# Patient Record
Sex: MALE | Race: WHITE | NOT HISPANIC OR LATINO | URBAN - METROPOLITAN AREA
[De-identification: names, ages, dates, MRNs, and addresses within clinical notes are randomized per-mention and may not be internally consistent; named-entity substitution may affect disease eponyms.]

---

## 2017-05-09 ENCOUNTER — OUTPATIENT (OUTPATIENT)
Dept: OUTPATIENT SERVICES | Facility: HOSPITAL | Age: 72
LOS: 1 days | Discharge: HOME | End: 2017-05-09
Payer: MEDICARE

## 2017-05-09 ENCOUNTER — RESULT REVIEW (OUTPATIENT)
Age: 72
End: 2017-05-09

## 2017-05-09 DIAGNOSIS — Z95.5 PRESENCE OF CORONARY ANGIOPLASTY IMPLANT AND GRAFT: Chronic | ICD-10-CM

## 2017-05-09 PROCEDURE — 88305 TISSUE EXAM BY PATHOLOGIST: CPT | Mod: 26

## 2017-12-10 ENCOUNTER — TRANSCRIPTION ENCOUNTER (OUTPATIENT)
Age: 72
End: 2017-12-10

## 2018-09-06 ENCOUNTER — APPOINTMENT (OUTPATIENT)
Dept: PULMONOLOGY | Facility: CLINIC | Age: 73
End: 2018-09-06
Payer: OTHER MISCELLANEOUS

## 2018-09-06 VITALS
WEIGHT: 183 LBS | DIASTOLIC BLOOD PRESSURE: 80 MMHG | SYSTOLIC BLOOD PRESSURE: 118 MMHG | BODY MASS INDEX: 27.11 KG/M2 | OXYGEN SATURATION: 94 % | HEIGHT: 69 IN | HEART RATE: 73 BPM

## 2018-09-06 DIAGNOSIS — R59.0 LOCALIZED ENLARGED LYMPH NODES: ICD-10-CM

## 2018-09-06 PROCEDURE — 94010 BREATHING CAPACITY TEST: CPT

## 2018-09-06 PROCEDURE — 99215 OFFICE O/P EST HI 40 MIN: CPT | Mod: 25

## 2018-10-22 ENCOUNTER — APPOINTMENT (OUTPATIENT)
Dept: PULMONOLOGY | Facility: CLINIC | Age: 73
End: 2018-10-22

## 2019-11-04 ENCOUNTER — APPOINTMENT (OUTPATIENT)
Dept: PULMONOLOGY | Facility: CLINIC | Age: 74
End: 2019-11-04
Payer: COMMERCIAL

## 2019-11-04 VITALS
SYSTOLIC BLOOD PRESSURE: 120 MMHG | WEIGHT: 178 LBS | HEART RATE: 63 BPM | OXYGEN SATURATION: 96 % | BODY MASS INDEX: 26.29 KG/M2 | DIASTOLIC BLOOD PRESSURE: 60 MMHG

## 2019-11-04 DIAGNOSIS — K21.0 GASTRO-ESOPHAGEAL REFLUX DISEASE WITH ESOPHAGITIS: ICD-10-CM

## 2019-11-04 PROCEDURE — 99214 OFFICE O/P EST MOD 30 MIN: CPT

## 2019-11-04 NOTE — PHYSICAL EXAM
[General Appearance - Well Developed] : well developed [General Appearance - Well Nourished] : well nourished [Normal Conjunctiva] : the conjunctiva exhibited no abnormalities [Neck Appearance] : the appearance of the neck was normal [Heart Rate And Rhythm] : heart rate and rhythm were normal [Heart Sounds] : normal S1 and S2 [Murmurs] : no murmurs present [Arterial Pulses Normal] : the arterial pulses were normal [Edema] : no peripheral edema present [] : no respiratory distress [Respiration, Rhythm And Depth] : normal respiratory rhythm and effort [Exaggerated Use Of Accessory Muscles For Inspiration] : no accessory muscle use [Auscultation Breath Sounds / Voice Sounds] : lungs were clear to auscultation bilaterally [Decreased Breath Sounds] : decreased breath sounds [Abdomen Soft] : soft [Abnormal Walk] : normal gait [Normal Oral Mucosa] : normal oral mucosa [Normal Oropharynx] : normal oropharynx [Nail Clubbing] : no clubbing of the fingernails [Cyanosis, Localized] : no localized cyanosis

## 2019-11-13 ENCOUNTER — APPOINTMENT (OUTPATIENT)
Dept: HEMATOLOGY ONCOLOGY | Facility: CLINIC | Age: 74
End: 2019-11-13
Payer: COMMERCIAL

## 2019-11-13 VITALS
TEMPERATURE: 98 F | DIASTOLIC BLOOD PRESSURE: 70 MMHG | HEIGHT: 71 IN | HEART RATE: 63 BPM | BODY MASS INDEX: 25.06 KG/M2 | SYSTOLIC BLOOD PRESSURE: 120 MMHG | WEIGHT: 179 LBS | OXYGEN SATURATION: 94 % | RESPIRATION RATE: 16 BRPM

## 2019-11-13 DIAGNOSIS — Z00.00 ENCOUNTER FOR GENERAL ADULT MEDICAL EXAMINATION W/OUT ABNORMAL FINDINGS: ICD-10-CM

## 2019-11-13 PROCEDURE — 99205 OFFICE O/P NEW HI 60 MIN: CPT

## 2019-11-13 PROCEDURE — 85025 COMPLETE CBC W/AUTO DIFF WBC: CPT

## 2019-11-14 LAB
ALBUMIN MFR SERPL ELPH: 63.7 %
ALBUMIN SERPL ELPH-MCNC: 4.5 G/DL
ALBUMIN SERPL-MCNC: 4.3 G/DL
ALBUMIN/GLOB SERPL: 1.7 RATIO
ALP BLD-CCNC: 57 U/L
ALPHA1 GLOB MFR SERPL ELPH: 3.4 %
ALPHA1 GLOB SERPL ELPH-MCNC: 0.2 G/DL
ALPHA2 GLOB MFR SERPL ELPH: 10.1 %
ALPHA2 GLOB SERPL ELPH-MCNC: 0.7 G/DL
ALT SERPL-CCNC: 20 U/L
ANION GAP SERPL CALC-SCNC: 14 MMOL/L
AST SERPL-CCNC: 22 U/L
B-GLOBULIN MFR SERPL ELPH: 10.8 %
B-GLOBULIN SERPL ELPH-MCNC: 0.7 G/DL
BILIRUB SERPL-MCNC: 0.4 MG/DL
BUN SERPL-MCNC: 24 MG/DL
CALCIUM SERPL-MCNC: 10 MG/DL
CHLORIDE SERPL-SCNC: 101 MMOL/L
CO2 SERPL-SCNC: 27 MMOL/L
CREAT SERPL-MCNC: 1.06 MG/DL
DEPRECATED KAPPA LC FREE/LAMBDA SER: 0.83 RATIO
DEPRECATED KAPPA LC FREE/LAMBDA SER: 0.83 RATIO
DIRECT COOMBS: NORMAL
FOLATE SERPL-MCNC: >20 NG/ML
GAMMA GLOB FLD ELPH-MCNC: 0.8 G/DL
GAMMA GLOB MFR SERPL ELPH: 12 %
GLUCOSE SERPL-MCNC: 152 MG/DL
HBV CORE IGG+IGM SER QL: NONREACTIVE
HBV SURFACE AB SER QL: NONREACTIVE
HBV SURFACE AG SER QL: NONREACTIVE
HIV1+2 AB SPEC QL IA.RAPID: NONREACTIVE
IGA SER QL IEP: 126 MG/DL
IGG SER QL IEP: 869 MG/DL
IGM SER QL IEP: 45 MG/DL
INTERPRETATION SERPL IEP-IMP: NORMAL
IRON SERPL-MCNC: 102 UG/DL
KAPPA LC CSF-MCNC: 1.79 MG/DL
KAPPA LC CSF-MCNC: 1.79 MG/DL
KAPPA LC SERPL-MCNC: 1.48 MG/DL
KAPPA LC SERPL-MCNC: 1.48 MG/DL
LDH SERPL-CCNC: 181 U/L
MAGNESIUM SERPL-MCNC: 1.5 MG/DL
PHOSPHATE SERPL-MCNC: 2.9 MG/DL
POTASSIUM SERPL-SCNC: 3.9 MMOL/L
PROT SERPL-MCNC: 6.8 G/DL
SODIUM SERPL-SCNC: 142 MMOL/L
URATE SERPL-MCNC: 7 MG/DL
VIT B12 SERPL-MCNC: 583 PG/ML

## 2019-11-14 NOTE — PHYSICAL EXAM
[Restricted in physically strenuous activity but ambulatory and able to carry out work of a light or sedentary nature] : Status 1- Restricted in physically strenuous activity but ambulatory and able to carry out work of a light or sedentary nature, e.g., light house work, office work [Normal] : affect appropriate [de-identified] : (+) L axillary flat, 3x3cm LN (+) many L inguinal sub-cm nodes [de-identified] : No CVA tenderness b/l, no TTP over iliac crest

## 2019-11-14 NOTE — REVIEW OF SYSTEMS
[Vision Problems] : vision problems [Patient Intake Form Reviewed] : Patient intake form was reviewed [SOB on Exertion] : shortness of breath during exertion [Negative] : Allergic/Immunologic [Shortness Of Breath] : no shortness of breath [Cough] : no cough [FreeTextEntry9] : see hpi [FreeTextEntry2] : see hpi [FreeTextEntry7] : see hpi

## 2019-11-14 NOTE — ASSESSMENT
[FreeTextEntry1] : ARNOLDO is a 73 y/o man with multiple chronic medical problems with treatment naive, Stage 1 CLL diagnosed in 2005 and possible MGUS (unclear, it was in one note). Reviewed medical records. Physical exam today was only remarkable for one flat, 3x3cm LS axillary node and multiple sub-cm LS inguinal nodes. Discussed w/ patient that because he has had the disease for over a decade without requiring any therapy and most of the prior imaging has been stable, from a CLL perspective we won't request serial radiographic imaging or bone marrow biopsies, unless he becomes symptomatic or there is a concern for progression of disease. Given potential concern for MGUS, will repeat testing today to determine if there is an ongoing concern. His CLL has been completely stable for over a decade, we discussed that there is a possibility that he may never require any therapy in his lifetime, however, he remains at increased risk for secondary malignancies (hence need for age-appropriate cancer screening) and infections (need for prophylactic vaccines).\par \par Plan:\par 1) CLL - Stage 1, stable. Ordered initial blood work. Will ask patient to donate blood for research at next visit.\par 2) MGUS - ordered blood work to confirm this possible diagnosis. Will consider 24 hour urine collection in future pending results (if any abnormalities are seen on initial testing).\par 3) Pancreatic cysts - continue follow up w/ Dr. Milian\par 4) HCM - f/u w/ PCP re: PCV23, c-scope, PSA\par \par RTO in 4 weeks to review results of testing (prognostic markers, SPEP/SHARLENE)

## 2019-11-14 NOTE — HISTORY OF PRESENT ILLNESS
[de-identified] : -2008: BM bx CLL and 11/2013: axillary lymph node bx showed CLL/SLL by flow cytometry and IHC [FreeTextEntry1] : Treatment naive [de-identified] : Neville Montes is a 73y/o man w/ PMH of pancreatic cysts, DM2, HTN, HLD, CAD s/p stent x3, h/o MI x 2, iron deficiency anemia, enlarged prostate, diverticulosis, nephrolithiasis, and CLL. \par \par He presents today for initial evaluation of CLL diagnosed in 2005 (Stage 0). He was previously under the care of Dr. Brad Baum, but he got concerned that he was getting too many insurance denials for the repeated imaging scans that he was getting, so he changed his medical insurance. Although he has had asymptomatic disease for over a decade, dr Baum was doing frequent PET scans as detailed below (and scanned to allscripts) and most recently, he wanted to "repeat a bone marrow biopsy" (even though his cell lines were all stable), which the patient denied.\par \par  ARNOLDO's most recent imaging was MRI abdomen done 9/17/2019 for the management of his known pancreatic cysts (follows doctor Maicol at [place]). MRI showed uncinate lesion increased in size, worsened hepatomegaly and hepatic steatosis--currently under monitoring by GI MD at [place]. \par \par He routinely follows GI MD, Pulm MD, and PCP. He had w/u for possible MM in 2014 after UPEP (+) Lambda Bence Navarrete Proteins, but to our knowledge, the patient was not diagnosed with MM.\par \par ***Additional prior imaging/work-up includes:\par - CT chest 3/15/2019 w/ numerous sub-centimeter b/l axillary and mediastinal nodes some appear more prominent than 10/2018. 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 Stable nodular density at right apex. \par -10/27/2018: CT scan w/o contrast showed stable 3mm RUL lung nodule unchanged from 8/29/2017 w/o suspicious lung nodule or consolidation. 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 Mild hepatomegaly and diffuse hepatic steatosis.\par - 8/29/2017: CT C/A/P w/ IV contrast showed chest w/o adenopathy, numerous stable shotty axillary and mediastinal nodes unchanged since 2014, stable minimal linear scarring at lung bases. A/P w/ vaguely discernible 8mm pancreas cyst and upper limit of nml caliber adjacent pancreas duct.. No adenopathy or pathologic fluid collection. Stable LT lower pole stones. Enlarged prostate projects into urinary bladder base. CT soft tissue neck w/o pathologic cervical lymph adenopathy. ABD US showed mild splenomegaly, pancreas obscured by bowel gas, small RT renal cyst. \par - 12/2014: PET scan w/ small hypermetabolic focus at level of LT common Iliac artery and LT  axillary nodes that are no longer hypermetabolic and are sub-centimeter in size, splenomegaly, and fatty liver.\par - 10/13/2013: PET/CT scan w/ multiple hypermetabolic LT axillary nodes\par \par HCM: Per review of records, h/o mild elevation of CEA (2013). Last c-scope was in 5/2017 w/ resection of one tubular adenoma. No recent PSA. Flu vax 10/19, Shingles and PCV23 ~3-4 years ago.\par \par CLL Hx: ARNOLDO was referred to Dr. Baum in 2005 by his PCP for elevated WBC ~10k-15k. CLL diagnosis was confirmed by BM bx. He was asymptomatic at the time of diagnosis. Denies frequent infections, fevers throughout course of illness. He has had periods of drenching night sweats over the years that occur 1-2x per mo now but are less severe. He had 10-15lbs unintentional weight loss because "food had lost its appeal", although he regained most of the weight (baseline 180s).  He reports slightly decreased energy. He is recovering from an illness that started with a sore throat (resolving) associated with 2 days of non-bloody, soft to watery stools. (+) sick contact. Denies cough, fever, congestion. He also c/o non-specific dull b/l lower back pain x ~5 months w/ increasing severity in the last 1-2 months. The pain is worse when laying down, pain can go as high as 7/10 in severity, and this is accompanied by b/l abdominal LQ pain, however during the day the pain is 2/10 and currently he is not in pain. He denies any precipitating trauma and hasn't had any work-up of this.

## 2019-12-10 ENCOUNTER — APPOINTMENT (OUTPATIENT)
Dept: HEMATOLOGY ONCOLOGY | Facility: CLINIC | Age: 74
End: 2019-12-10
Payer: COMMERCIAL

## 2019-12-10 VITALS
RESPIRATION RATE: 16 BRPM | HEART RATE: 56 BPM | DIASTOLIC BLOOD PRESSURE: 76 MMHG | TEMPERATURE: 97.9 F | HEIGHT: 71 IN | OXYGEN SATURATION: 95 % | SYSTOLIC BLOOD PRESSURE: 130 MMHG | WEIGHT: 183 LBS | BODY MASS INDEX: 25.62 KG/M2

## 2019-12-10 PROCEDURE — 99215 OFFICE O/P EST HI 40 MIN: CPT

## 2019-12-10 PROCEDURE — 85025 COMPLETE CBC W/AUTO DIFF WBC: CPT

## 2019-12-12 LAB
ALBUMIN SERPL ELPH-MCNC: 4.2 G/DL
ALP BLD-CCNC: 50 U/L
ALT SERPL-CCNC: 21 U/L
ANION GAP SERPL CALC-SCNC: 14 MMOL/L
AST SERPL-CCNC: 19 U/L
BILIRUB SERPL-MCNC: 0.4 MG/DL
BUN SERPL-MCNC: 22 MG/DL
CALCIUM SERPL-MCNC: 9.4 MG/DL
CHLORIDE SERPL-SCNC: 100 MMOL/L
CO2 SERPL-SCNC: 25 MMOL/L
CREAT SERPL-MCNC: 1.06 MG/DL
GLUCOSE SERPL-MCNC: 250 MG/DL
LDH SERPL-CCNC: 159 U/L
MAGNESIUM SERPL-MCNC: 1.4 MG/DL
PHOSPHATE SERPL-MCNC: 2.5 MG/DL
POTASSIUM SERPL-SCNC: 3.8 MMOL/L
PROT SERPL-MCNC: 6.2 G/DL
SODIUM SERPL-SCNC: 139 MMOL/L
URATE SERPL-MCNC: 6.3 MG/DL

## 2019-12-31 NOTE — RESULTS/DATA
[FreeTextEntry1] : CBC 12/10/19:\par \par WBC 18.8\par HGB 13.0\par HCT 38.0\par .0\par ALC 14.29\par ANC 3.17\par AEC 0.60\par

## 2019-12-31 NOTE — PHYSICAL EXAM
[Restricted in physically strenuous activity but ambulatory and able to carry out work of a light or sedentary nature] : Status 1- Restricted in physically strenuous activity but ambulatory and able to carry out work of a light or sedentary nature, e.g., light house work, office work [Normal] : grossly intact [de-identified] : (+) many L inguinal sub-cm nodes

## 2019-12-31 NOTE — REVIEW OF SYSTEMS
[SOB on Exertion] : shortness of breath during exertion [Negative] : Eyes [Shortness Of Breath] : no shortness of breath [Cough] : no cough [FreeTextEntry2] : see hpi [FreeTextEntry6] : Progressive SOB on exertion (after one flight of stairs) and occasionally at rest over the last year [FreeTextEntry9] : see hpi [FreeTextEntry7] : see hpi

## 2019-12-31 NOTE — HISTORY OF PRESENT ILLNESS
[de-identified] : Neville Montes is a 73y/o man w/ PMH of pancreatic cysts, DM2, HTN, HLD, CAD s/p stent x3, h/o MI x 2, iron deficiency anemia, enlarged prostate, diverticulosis, nephrolithiasis, and CLL. \par \par He presents today for initial evaluation of CLL diagnosed in 2005 (Stage 0). He was previously under the care of Dr. Brad Baum, but he got concerned that he was getting too many insurance denials for the repeated imaging scans that he was getting, so he changed his medical insurance. Although he has had asymptomatic disease for over a decade, dr Baum was doing frequent PET scans as detailed below (and scanned to allscripts) and most recently, he wanted to "repeat a bone marrow biopsy" (even though his cell lines were all stable), which the patient denied.\par \par  JM's most recent imaging was MRI abdomen done 9/17/2019 for the management of his known pancreatic cysts (follows doctor Maicol at [place]). MRI showed uncinate lesion increased in size, worsened hepatomegaly and hepatic steatosis--currently under monitoring by GI MD at [place]. He saw him recently and told him now he only needs to see him once a year, so next appt will be in 2020.\par \par He routinely follows GI MD, Pulm MD, and PCP. He had w/u for possible MM in 2014 after UPEP (+) Lambda Bence Navarrete Proteins, but to our knowledge, the patient was not diagnosed with MM. Repeat testing done here was wnl.\par \par Mr. Montes presents today for CLL (Stage 1) follow-up. He has no new complaints. Denies recent infection (previous sore throat resolved), fever, worsening fatigue, or worsening night sweats. Continuing to have intermittent soft to watery stools. His appetite continues to be poor, but stable. His back and abdominal pain are stable from last visit. He will schedule an appointment to see his urologist to rule out kidney stones, which he has a history of. Saw Dr. Milian yesterday (12/09/2019) and reports his pancreatic cysts are stable and will follow up in one year.\par \par ***Additional prior imaging/work-up includes:\par - CT chest 3/15/2019 w/ numerous sub-centimeter b/l axillary and mediastinal nodes some appear more prominent than 10/2018. 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 Stable nodular density at right apex. \par -10/27/2018: CT scan w/o contrast showed stable 3mm RUL lung nodule unchanged from 8/29/2017 w/o suspicious lung nodule or consolidation. 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 Mild hepatomegaly and diffuse hepatic steatosis.\par - 8/29/2017: CT C/A/P w/ IV contrast showed chest w/o adenopathy, numerous stable shotty axillary and mediastinal nodes unchanged since 2014, stable minimal linear scarring at lung bases. A/P w/ vaguely discernible 8mm pancreas cyst and upper limit of nml caliber adjacent pancreas duct.. No adenopathy or pathologic fluid collection. Stable LT lower pole stones. Enlarged prostate projects into urinary bladder base. CT soft tissue neck w/o pathologic cervical lymph adenopathy. ABD US showed mild splenomegaly, pancreas obscured by bowel gas, small RT renal cyst. \par - 12/2014: PET scan w/ small hypermetabolic focus at level of LT common Iliac artery and LT  axillary nodes that are no longer hypermetabolic and are sub-centimeter in size, splenomegaly, and fatty liver.\par - 10/13/2013: PET/CT scan w/ multiple hypermetabolic LT axillary nodes\par \par HCM: Per review of records, h/o mild elevation of CEA (2013). Last c-scope was in 5/2017 w/ resection of one tubular adenoma. No recent PSA. Flu vax 10/19, Shingles and PCV23 ~3-4 years ago.\par \par CLL Hx: ARNOLDO was referred to Dr. Baum in 2005 by his PCP for elevated WBC ~10k-15k. CLL diagnosis was confirmed by BM bx. He has been asymptomatic since diagnosis.\par \par  [de-identified] : -2008: BM bx CLL and 11/2013: axillary lymph node bx showed CLL/SLL by flow cytometry and IHC [FreeTextEntry1] : Treatment naive [0 - No Distress] : Distress Level: 0

## 2019-12-31 NOTE — ASSESSMENT
[FreeTextEntry1] : ARNOLDO is a 75 y/o man with multiple chronic medical problems with treatment naive, Stage 1 CLL diagnosed in 2005. Physical exam today remarkable only for multiple sub-centimeter inguinal lymph nodes. Reviewed initial work-up which was negative. Given potential concern for MGUS per one note in records, SPEP/SHARLENE was ordered at last visit and reviewed negative results with patient today. His CLL has been completely stable for over a decade, we discussed that there is a possibility that he may never require any therapy in his lifetime, however, he remains at increased risk for secondary malignancies (hence need for age-appropriate cancer screening) and infections (need for prophylactic vaccines).\par \par Plan:\par 1) CLL - Stage 1, stable. \par 2) MGUS - SPEP and SHARLENE both WNL. No need for further w/u at this time.\par 3) Pancreatic cysts - continue follow up w/ Dr. Milian in one year.\par 4) HCM - f/u w/ PCP re: PCV23, c-scope, PSA\par \par RTO in 3 months.\par

## 2020-03-17 ENCOUNTER — APPOINTMENT (OUTPATIENT)
Dept: HEMATOLOGY ONCOLOGY | Facility: CLINIC | Age: 75
End: 2020-03-17

## 2020-07-09 ENCOUNTER — TRANSCRIPTION ENCOUNTER (OUTPATIENT)
Age: 75
End: 2020-07-09

## 2021-03-29 ENCOUNTER — TRANSCRIPTION ENCOUNTER (OUTPATIENT)
Age: 76
End: 2021-03-29

## 2021-06-16 ENCOUNTER — APPOINTMENT (OUTPATIENT)
Dept: HEMATOLOGY ONCOLOGY | Facility: CLINIC | Age: 76
End: 2021-06-16
Payer: COMMERCIAL

## 2021-06-16 VITALS
WEIGHT: 183 LBS | OXYGEN SATURATION: 97 % | BODY MASS INDEX: 25.52 KG/M2 | DIASTOLIC BLOOD PRESSURE: 80 MMHG | SYSTOLIC BLOOD PRESSURE: 122 MMHG | HEART RATE: 72 BPM

## 2021-06-16 PROCEDURE — 99215 OFFICE O/P EST HI 40 MIN: CPT

## 2021-06-16 PROCEDURE — 85025 COMPLETE CBC W/AUTO DIFF WBC: CPT

## 2021-06-21 LAB
ALBUMIN MFR SERPL ELPH: 63 %
ALBUMIN SERPL ELPH-MCNC: 4.5 G/DL
ALBUMIN SERPL-MCNC: 4.3 G/DL
ALBUMIN/GLOB SERPL: 1.7 RATIO
ALP BLD-CCNC: 57 U/L
ALPHA1 GLOB MFR SERPL ELPH: 4.1 %
ALPHA1 GLOB SERPL ELPH-MCNC: 0.3 G/DL
ALPHA2 GLOB MFR SERPL ELPH: 10.1 %
ALPHA2 GLOB SERPL ELPH-MCNC: 0.7 G/DL
ALT SERPL-CCNC: 22 U/L
ANION GAP SERPL CALC-SCNC: 13 MMOL/L
AST SERPL-CCNC: 22 U/L
B-GLOBULIN MFR SERPL ELPH: 11.3 %
B-GLOBULIN SERPL ELPH-MCNC: 0.8 G/DL
BILIRUB SERPL-MCNC: 0.4 MG/DL
BUN SERPL-MCNC: 28 MG/DL
CALCIUM SERPL-MCNC: 9.9 MG/DL
CHLORIDE SERPL-SCNC: 101 MMOL/L
CO2 SERPL-SCNC: 25 MMOL/L
COVID-19 SPIKE DOMAIN ANTIBODY INTERPRETATION: POSITIVE
CREAT SERPL-MCNC: 1.25 MG/DL
DEPRECATED KAPPA LC FREE/LAMBDA SER: 0.74 RATIO
GAMMA GLOB FLD ELPH-MCNC: 0.8 G/DL
GAMMA GLOB MFR SERPL ELPH: 11.5 %
GLUCOSE SERPL-MCNC: 151 MG/DL
IGA SER QL IEP: 115 MG/DL
IGG SER QL IEP: 827 MG/DL
IGM SER QL IEP: 45 MG/DL
INTERPRETATION SERPL IEP-IMP: NORMAL
KAPPA LC CSF-MCNC: 2.54 MG/DL
KAPPA LC SERPL-MCNC: 1.88 MG/DL
LDH SERPL-CCNC: 191 U/L
M PROTEIN SPEC IFE-MCNC: NORMAL
MAGNESIUM SERPL-MCNC: 1.5 MG/DL
PHOSPHATE SERPL-MCNC: 3.3 MG/DL
POTASSIUM SERPL-SCNC: 4.5 MMOL/L
PROT SERPL-MCNC: 6.8 G/DL
SARS-COV-2 AB SERPL IA-ACNC: >250 U/ML
SODIUM SERPL-SCNC: 140 MMOL/L
URATE SERPL-MCNC: 6.6 MG/DL

## 2021-06-21 NOTE — HISTORY OF PRESENT ILLNESS
[0 - No Distress] : Distress Level: 0 [de-identified] : Neville Montes is a 73y/o man w/ PMH of pancreatic cysts, DM2, HTN, HLD, CAD s/p stent x3, h/o MI x 2, iron deficiency anemia, enlarged prostate, diverticulosis, nephrolithiasis, and CLL. \par \par He presented for initial evaluation of CLL diagnosed in 2005 (Stage 0) to our clinic Nov 2019. He was previously under the care of Dr. Brad Baum, but he got concerned that he was getting too many insurance denials for the repeated imaging scans that he was getting, so he changed his medical insurance. Although he has had asymptomatic disease for over a decade, dr Baum was doing frequent PET scans as detailed below (and scanned to allscripts) and most recently, he wanted to "repeat a bone marrow biopsy" (even though his cell lines were all stable), which the patient refused to do.\par \par  ARNOLDO's most recent imaging was MRI abdomen done 9/17/2019 for the management of his known pancreatic cysts (follows doctor Maicol at [place]). MRI showed uncinate lesion increased in size, worsened hepatomegaly and hepatic steatosis--currently under monitoring by GI MD at [place]. He saw him recently and told him now he only needs to see him once a year, so next appt will be in 8/2021.\par \par He routinely follows GI MD, Pulm MD, and PCP. He had w/u for possible MM in 2014 after UPEP (+) Lambda Bence Navarrete Proteins, but to our knowledge, the patient was not diagnosed with MM. Repeat testing done here was wnl.\par \par \par \par ***Additional prior imaging/work-up includes:\par - CT chest 3/15/2019 w/ numerous sub-centimeter b/l axillary and mediastinal nodes some appear more prominent than 10/2018. 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 Stable nodular density at right apex. \par -10/27/2018: CT scan w/o contrast showed stable 3mm RUL lung nodule unchanged from 8/29/2017 w/o suspicious lung nodule or consolidation. 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 Mild hepatomegaly and diffuse hepatic steatosis.\par - 8/29/2017: CT C/A/P w/ IV contrast showed chest w/o adenopathy, numerous stable shotty axillary and mediastinal nodes unchanged since 2014, stable minimal linear scarring at lung bases. A/P w/ vaguely discernible 8mm pancreas cyst and upper limit of nml caliber adjacent pancreas duct.. No adenopathy or pathologic fluid collection. Stable LT lower pole stones. Enlarged prostate projects into urinary bladder base. CT soft tissue neck w/o pathologic cervical lymph adenopathy. ABD US showed mild splenomegaly, pancreas obscured by bowel gas, small RT renal cyst. \par - 12/2014: PET scan w/ small hypermetabolic focus at level of LT common Iliac artery and LT  axillary nodes that are no longer hypermetabolic and are sub-centimeter in size, splenomegaly, and fatty liver.\par - 10/13/2013: PET/CT scan w/ multiple hypermetabolic LT axillary nodes\par \par HCM: Per review of records, h/o mild elevation of CEA (2013). Last c-scope was in 5/2017 w/ resection of one tubular adenoma. No recent PSA. Flu vax 10/19, Shingles and PCV23 ~3-4 years ago.\par \par CLL Hx: ARNOLDO was referred to Dr. Baum in 2005 by his PCP for elevated WBC ~10k-15k. CLL diagnosis was confirmed by BM bx. He has been asymptomatic since diagnosis.\par \par  [de-identified] : -2008: BM bx CLL and 11/2013: axillary lymph node bx showed CLL/SLL by flow cytometry and IHC [FreeTextEntry1] : Treatment naive [de-identified] : 6/16/21: Mr. Montes presents today for CLL (Stage 1) follow-up. He has no new complaints. Denies recent infection, fever, worsening fatigue, or worsening night sweats (he continue to have sweats ~1x a week). Pt denies diarrhea/constipation, or abdominal pain. His appetite continues to be stable. Pt continues to follow up with Dr. Milian  and will be scheduled for colonoscopy in 8/2021. Pt has had both doses of the Moderna vaccine 2/2021.

## 2021-06-21 NOTE — PHYSICAL EXAM
[Restricted in physically strenuous activity but ambulatory and able to carry out work of a light or sedentary nature] : Status 1- Restricted in physically strenuous activity but ambulatory and able to carry out work of a light or sedentary nature, e.g., light house work, office work [Normal] : affect appropriate [de-identified] : (+) many L inguinal sub-cm nodes

## 2021-06-21 NOTE — RESULTS/DATA
[FreeTextEntry1] : CBC 06/16/21\par \par WBC 21.1\par HGB 13.2\par HCT 40.5\par \par ALC 17.01\par ANC 2.74\par

## 2021-06-21 NOTE — REVIEW OF SYSTEMS
[SOB on Exertion] : shortness of breath during exertion [Negative] : Allergic/Immunologic [Shortness Of Breath] : no shortness of breath [Cough] : no cough [FreeTextEntry2] : see hpi [FreeTextEntry6] : Progressive SOB on exertion (after one flight of stairs) and occasionally at rest  [FreeTextEntry7] : see hpi [FreeTextEntry9] : see hpi

## 2021-06-21 NOTE — ASSESSMENT
[FreeTextEntry1] : ARNOLDO is a 75 y/o man with multiple chronic medical problems with treatment naive, Stage 1 CLL diagnosed in 2005.  His CLL has been completely stable for over a decade, we discussed that there is a possibility that he may never require any therapy in his lifetime, however, he remains at increased risk for secondary malignancies (hence need for age-appropriate cancer screening) and infections (need for prophylactic vaccines).\par \par Plan:\par 1) CLL - Stage 1, stable. \par 2) MGUS - SPEP and SHARLENE both WNL. No need for further w/u at this time.\par 3) Pancreatic cysts - continue follow up w/ Dr. Milian \par 4) HCM - f/u w/ PCP, Urologist. Pt has not had a skin check in the past few years, will send referral \par \par RTO in 6 months.\par

## 2021-06-25 ENCOUNTER — APPOINTMENT (OUTPATIENT)
Dept: DERMATOLOGY | Facility: CLINIC | Age: 76
End: 2021-06-25
Payer: MEDICARE

## 2021-06-25 PROCEDURE — 99203 OFFICE O/P NEW LOW 30 MIN: CPT

## 2021-07-28 ENCOUNTER — NON-APPOINTMENT (OUTPATIENT)
Age: 76
End: 2021-07-28

## 2021-10-13 ENCOUNTER — APPOINTMENT (OUTPATIENT)
Dept: HEMATOLOGY ONCOLOGY | Facility: CLINIC | Age: 76
End: 2021-10-13
Payer: MEDICARE

## 2021-10-13 VITALS
WEIGHT: 184 LBS | SYSTOLIC BLOOD PRESSURE: 160 MMHG | DIASTOLIC BLOOD PRESSURE: 70 MMHG | HEART RATE: 67 BPM | TEMPERATURE: 98.2 F | OXYGEN SATURATION: 97 % | BODY MASS INDEX: 25.66 KG/M2

## 2021-10-13 DIAGNOSIS — I10 ESSENTIAL (PRIMARY) HYPERTENSION: ICD-10-CM

## 2021-10-13 DIAGNOSIS — Z23 ENCOUNTER FOR IMMUNIZATION: ICD-10-CM

## 2021-10-13 PROCEDURE — 85025 COMPLETE CBC W/AUTO DIFF WBC: CPT

## 2021-10-13 PROCEDURE — 99215 OFFICE O/P EST HI 40 MIN: CPT

## 2021-10-13 RX ORDER — LOSARTAN POTASSIUM 50 MG/1
50 TABLET, FILM COATED ORAL
Refills: 0 | Status: ACTIVE | COMMUNITY
Start: 2021-10-13

## 2021-10-13 RX ORDER — METOPROLOL TARTRATE 50 MG/1
50 TABLET, FILM COATED ORAL
Refills: 0 | Status: ACTIVE | COMMUNITY

## 2021-10-13 RX ORDER — PRAVASTATIN SODIUM 80 MG/1
80 TABLET ORAL
Refills: 0 | Status: ACTIVE | COMMUNITY

## 2021-10-13 RX ORDER — LISINOPRIL 20 MG/1
20 TABLET ORAL
Refills: 0 | Status: DISCONTINUED | COMMUNITY
End: 2021-10-13

## 2021-10-13 RX ORDER — METFORMIN HYDROCHLORIDE 1000 MG/1
1000 TABLET, COATED ORAL AT BEDTIME
Refills: 0 | Status: ACTIVE | COMMUNITY

## 2021-10-13 RX ORDER — UBIDECARENONE 100 MG
100 CAPSULE ORAL
Refills: 0 | Status: ACTIVE | COMMUNITY
Start: 2021-10-13

## 2021-10-13 RX ORDER — APIXABAN 5 MG/1
5 TABLET, FILM COATED ORAL
Refills: 0 | Status: ACTIVE | COMMUNITY

## 2021-10-13 RX ORDER — ALFUZOSIN HYDROCHLORIDE 10 MG/1
10 TABLET, EXTENDED RELEASE ORAL
Refills: 0 | Status: ACTIVE | COMMUNITY

## 2021-10-13 RX ORDER — SPIRONOLACTONE 25 MG
600-400 TABLET ORAL
Qty: 60 | Refills: 3 | Status: DISCONTINUED | COMMUNITY
Start: 2021-06-16 | End: 2021-10-13

## 2021-10-13 RX ORDER — FINASTERIDE 5 MG/1
5 TABLET, FILM COATED ORAL
Refills: 0 | Status: ACTIVE | COMMUNITY

## 2021-10-16 LAB
ALBUMIN SERPL ELPH-MCNC: 4.7 G/DL
ALP BLD-CCNC: 55 U/L
ALT SERPL-CCNC: 19 U/L
ANION GAP SERPL CALC-SCNC: 14 MMOL/L
AST SERPL-CCNC: 17 U/L
BILIRUB SERPL-MCNC: 0.3 MG/DL
BUN SERPL-MCNC: 29 MG/DL
CALCIUM SERPL-MCNC: 9.6 MG/DL
CHLORIDE SERPL-SCNC: 101 MMOL/L
CO2 SERPL-SCNC: 24 MMOL/L
CREAT SERPL-MCNC: 1.09 MG/DL
GLUCOSE SERPL-MCNC: 229 MG/DL
LDH SERPL-CCNC: 166 U/L
MAGNESIUM SERPL-MCNC: 1.4 MG/DL
PHOSPHATE SERPL-MCNC: 2.9 MG/DL
POTASSIUM SERPL-SCNC: 4 MMOL/L
PROT SERPL-MCNC: 6.5 G/DL
SODIUM SERPL-SCNC: 138 MMOL/L
URATE SERPL-MCNC: 6 MG/DL

## 2021-10-19 LAB
ALBUMIN MFR SERPL ELPH: 64.5 %
ALBUMIN SERPL-MCNC: 4.2 G/DL
ALBUMIN/GLOB SERPL: 1.8 RATIO
ALPHA1 GLOB MFR SERPL ELPH: 3.6 %
ALPHA1 GLOB SERPL ELPH-MCNC: 0.2 G/DL
ALPHA2 GLOB MFR SERPL ELPH: 10.4 %
ALPHA2 GLOB SERPL ELPH-MCNC: 0.7 G/DL
B-GLOBULIN MFR SERPL ELPH: 10.3 %
B-GLOBULIN SERPL ELPH-MCNC: 0.7 G/DL
DEPRECATED KAPPA LC FREE/LAMBDA SER: 0.69 RATIO
GAMMA GLOB FLD ELPH-MCNC: 0.7 G/DL
GAMMA GLOB MFR SERPL ELPH: 11.2 %
IGA SER QL IEP: 102 MG/DL
IGG SER QL IEP: 807 MG/DL
IGM SER QL IEP: 44 MG/DL
INTERPRETATION SERPL IEP-IMP: NORMAL
KAPPA LC CSF-MCNC: 2.46 MG/DL
KAPPA LC SERPL-MCNC: 1.7 MG/DL
M PROTEIN SPEC IFE-MCNC: NORMAL
PROT SERPL-MCNC: 6.5 G/DL
PROT SERPL-MCNC: 6.5 G/DL

## 2022-01-04 ENCOUNTER — LABORATORY RESULT (OUTPATIENT)
Age: 77
End: 2022-01-04

## 2022-01-06 LAB
ALBUMIN SERPL ELPH-MCNC: 4.5 G/DL
ALP BLD-CCNC: 54 U/L
ALT SERPL-CCNC: 27 U/L
ANION GAP SERPL CALC-SCNC: 17 MMOL/L
AST SERPL-CCNC: 24 U/L
BASOPHILS # BLD AUTO: 0 K/UL
BASOPHILS NFR BLD AUTO: 0 %
BILIRUB SERPL-MCNC: 0.4 MG/DL
BUN SERPL-MCNC: 31 MG/DL
CALCIUM SERPL-MCNC: 9.5 MG/DL
CHLORIDE SERPL-SCNC: 100 MMOL/L
CO2 SERPL-SCNC: 21 MMOL/L
CREAT SERPL-MCNC: 1.19 MG/DL
EOSINOPHIL # BLD AUTO: 0.85 K/UL
EOSINOPHIL NFR BLD AUTO: 4 %
GLUCOSE SERPL-MCNC: 197 MG/DL
HCT VFR BLD CALC: 39.2 %
HGB BLD-MCNC: 12.5 G/DL
LDH SERPL-CCNC: 167 U/L
LYMPHOCYTES # BLD AUTO: 17.17 K/UL
LYMPHOCYTES NFR BLD AUTO: 81 %
MAGNESIUM SERPL-MCNC: 1.3 MG/DL
MAN DIFF?: NORMAL
MCHC RBC-ENTMCNC: 30.3 PG
MCHC RBC-ENTMCNC: 31.9 GM/DL
MCV RBC AUTO: 94.9 FL
MONOCYTES # BLD AUTO: 0.42 K/UL
MONOCYTES NFR BLD AUTO: 2 %
NEUTROPHILS # BLD AUTO: 2.33 K/UL
NEUTROPHILS NFR BLD AUTO: 11 %
PHOSPHATE SERPL-MCNC: 2.7 MG/DL
PLATELET # BLD AUTO: 117 K/UL
POTASSIUM SERPL-SCNC: 4.3 MMOL/L
PROT SERPL-MCNC: 6.5 G/DL
RBC # BLD: 4.13 M/UL
RBC # FLD: 13.1 %
SODIUM SERPL-SCNC: 139 MMOL/L
URATE SERPL-MCNC: 6.6 MG/DL
WBC # FLD AUTO: 21.2 K/UL

## 2022-01-12 ENCOUNTER — APPOINTMENT (OUTPATIENT)
Dept: HEMATOLOGY ONCOLOGY | Facility: CLINIC | Age: 77
End: 2022-01-12
Payer: MEDICARE

## 2022-01-12 PROCEDURE — 99443: CPT

## 2022-01-17 NOTE — RESULTS/DATA
[FreeTextEntry1] : CBC 1/12/22:\par \par WBC 21.1\par HGB 13.2\par HCT 40.5\par \par ALC 17.01\par ANC 2.74\par

## 2022-01-17 NOTE — REVIEW OF SYSTEMS
[SOB on Exertion] : shortness of breath during exertion [Negative] : Musculoskeletal [Shortness Of Breath] : no shortness of breath [Cough] : no cough [FreeTextEntry2] : see hpi [FreeTextEntry6] : Progressive SOB on exertion (after one flight of stairs) and occasionally at rest

## 2022-01-17 NOTE — HISTORY OF PRESENT ILLNESS
[Home] : at home, [unfilled] , at the time of the visit. [Medical Office: ([facility])___] : at the medical office located in  [Verbal consent obtained from patient] : the patient, [unfilled] [Disease:__________________________] : Disease: [unfilled] [de-identified] : Neville Montes is a 73y/o man w/ PMH of pancreatic cysts, DM2, HTN, HLD, CAD s/p stent x3, h/o MI x 2, iron deficiency anemia, enlarged prostate, diverticulosis, nephrolithiasis, and CLL. \par \par He presented for initial evaluation of CLL diagnosed in 2005 (Stage 0) to our clinic Nov 2019. He was previously under the care of Dr. Brad Baum, but he got concerned that he was getting too many insurance denials for the repeated imaging scans that he was getting, so he changed his medical insurance. Although he has had asymptomatic disease for over a decade, dr Baum was doing frequent PET scans as detailed below (and scanned to allscripts). He had w/u for possible MM in 2014 after UPEP (+) Lambda Bence Navarrete Proteins, but to our knowledge, the patient was not diagnosed with MM. Repeat testing done here was wnl.\par \par He gets repeated MRI abdomen to evaluate known pancreatic cysts (follows doctor Maicol at [facility]). Last MRI showed uncinate lesion increased in size, worsened hepatomegaly and hepatic steatosis--currently under monitoring by GI MD at [facility]. He sees him once a year. \par \par He routinely follows GI MD, Pulm MD, and PCP. \par \par ***Additional prior imaging/work-up includes:\par - CT chest 3/15/2019 w/ numerous sub-centimeter b/l axillary and mediastinal nodes some appear more prominent than 10/2018. 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 Stable nodular density at right apex. \par -10/27/2018: CT scan w/o contrast showed stable 3mm RUL lung nodule unchanged from 8/29/2017 w/o suspicious lung nodule or consolidation. 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 Mild hepatomegaly and diffuse hepatic steatosis.\par - 8/29/2017: CT C/A/P w/ IV contrast showed chest w/o adenopathy, numerous stable shotty axillary and mediastinal nodes unchanged since 2014, stable minimal linear scarring at lung bases. A/P w/ vaguely discernible 8mm pancreas cyst and upper limit of nml caliber adjacent pancreas duct.. No adenopathy or pathologic fluid collection. Stable LT lower pole stones. Enlarged prostate projects into urinary bladder base. CT soft tissue neck w/o pathologic cervical lymph adenopathy. ABD US showed mild splenomegaly, pancreas obscured by bowel gas, small RT renal cyst. \par - 12/2014: PET scan w/ small hypermetabolic focus at level of LT common Iliac artery and LT  axillary nodes that are no longer hypermetabolic and are sub-centimeter in size, splenomegaly, and fatty liver.\par - 10/13/2013: PET/CT scan w/ multiple hypermetabolic LT axillary nodes\par \par HCM: Per review of records, h/o mild elevation of CEA (2013). Last c-scope was in 5/2017 w/ resection of one tubular adenoma. No recent PSA. Flu vax 10/19, Shingles and PCV23 ~3-4 years ago.\par \par CLL Hx: ARNOLDO was referred to Dr. Baum in 2005 by his PCP for elevated WBC ~10k-15k. CLL diagnosis was confirmed by BM bx. He has been asymptomatic since diagnosis.\par \par  [de-identified] : -2008: BM bx CLL and 11/2013: axillary lymph node bx showed CLL/SLL by flow cytometry and IHC [FreeTextEntry1] : Treatment naive [de-identified] : 1/12/22: Mr. Montes presents today for CLL (Stage 1) follow-up. He has no new complaints. Denies  fevers, worsening fatigue, or worsening night sweats (he continues to have sweats ~1x a week). Pt denies diarrhea/constipation, or abdominal pain. Denies enlarged/bothersome lymph nodes. His appetite continues to be stable. No recent/recurrent infections. Pt reports he is seeing cardiology and is in the plan of having TAVR procedure in the coming months.

## 2022-01-17 NOTE — ASSESSMENT
[FreeTextEntry1] : ARNOLDO is a 75 y/o man with multiple chronic medical problems with treatment naive, Stage 1 CLL diagnosed in 2005.  His CLL has been completely stable for over a decade, we discussed that there is a possibility that he may never require any therapy in his lifetime, however, he remains at increased risk for secondary malignancies (hence need for age-appropriate cancer screening) and infections (need for prophylactic vaccines).\par He will require a TAVR in the coming months.\par \par \par Plan:\par 1) CLL - Stage 1, stable. Cont active surveillance.\par 2) MGUS - SPEP and SHARLENE both WNL. No need for further w/u at this time.\par 3) Pancreatic cysts - continue follow up w/ Dr. Milian \par 4) HCM - f/u w/ PCP, Urologist, derm \par \par RTO in 6 months, sooner if necessary. He is aware that I will be leaving the institution and he will f/u w Dr Mir.\par

## 2022-04-11 ENCOUNTER — APPOINTMENT (OUTPATIENT)
Dept: PULMONOLOGY | Facility: CLINIC | Age: 77
End: 2022-04-11
Payer: COMMERCIAL

## 2022-04-11 VITALS
DIASTOLIC BLOOD PRESSURE: 78 MMHG | SYSTOLIC BLOOD PRESSURE: 130 MMHG | HEIGHT: 70 IN | OXYGEN SATURATION: 95 % | BODY MASS INDEX: 25.77 KG/M2 | WEIGHT: 180 LBS | HEART RATE: 65 BPM

## 2022-04-11 PROCEDURE — 99204 OFFICE O/P NEW MOD 45 MIN: CPT

## 2022-04-11 RX ORDER — BLOOD SUGAR DIAGNOSTIC
STRIP MISCELLANEOUS
Qty: 200 | Refills: 0 | Status: ACTIVE | COMMUNITY
Start: 2021-11-18

## 2022-04-11 RX ORDER — LANCETS 28 GAUGE
EACH MISCELLANEOUS
Qty: 200 | Refills: 0 | Status: ACTIVE | COMMUNITY
Start: 2021-11-18

## 2022-04-11 RX ORDER — AMLODIPINE BESYLATE 5 MG/1
5 TABLET ORAL DAILY
Qty: 90 | Refills: 1 | Status: ACTIVE | COMMUNITY
Start: 2021-06-16

## 2022-04-11 RX ORDER — BLOOD-GLUCOSE METER
W/DEVICE KIT MISCELLANEOUS
Qty: 1 | Refills: 0 | Status: ACTIVE | COMMUNITY
Start: 2021-11-18

## 2022-04-11 RX ORDER — METOPROLOL SUCCINATE 25 MG/1
25 TABLET, EXTENDED RELEASE ORAL
Qty: 90 | Refills: 0 | Status: ACTIVE | COMMUNITY
Start: 2021-11-18

## 2022-04-11 NOTE — HISTORY OF PRESENT ILLNESS
[TextBox_4] : 76 yo gentleman , nonsmoker, ex  in [place]\par Seen here in past for COPD\par Treated for COPD in past with advair, now on minimal PRN use of albuterol\par Sent back here by Cardiologist because he is having more SANCHEZ than before\par \par Last CT chest one year ago-- 5 mm RUL lesion ? increased from 3 mm in earlier films\par PET /CT from 2014 were negative\par \par Known CLL for >10 years , not treated and followed by Hemne\par Hx MGUS\par Hx ASHD, s/p senting in 1999, 2010, 2017 Dr baldwin\par Recent followup of aorttic valve disease and TAVR has been considered, but not just yet\par s/p CVA after first stent\par \par On Eliquis\par  3 children 6 GC\par No ETOH\par

## 2022-04-11 NOTE — ASSESSMENT
[FreeTextEntry1] : 76 yo gentleman , nonsmoker, ex  in [place]\par Seen here in past for COPD\par Treated for COPD in past with advair, now on minimal PRN use of albuterol\par Sent back here by Cardiologist because he is having more SANCHEZ than before\par \par Last CT chest one year ago-- 5 mm RUL lesion ? increased from 3 mm in earlier films\par PET /CT from 2014 were negative\par \par Known CLL for >10 years , not treated and followed by Hemne\par Hx MGUS\par Hx ASHD, s/p senting in 1999, 2010, 2017 Dr baldwin\par Recent followup of aorttic valve disease and TAVR has been considered, but not just yet\par s/p CVA after first stent\par \par On Eliquis\par  3 children 6 GC\par No ETOH\par \par Imp 76 yo man with stable CLL, CAD, s/p stenting and aortic valve disease\par May be more dyspneic due to cardiac status but will rule out worsened pulmonary disease\par \par Note 5 mm nodule in RUL, altho not a smoker, had much exposure in past to smoke\par Repeat CT chest and compare to last years films\par \par Cannot rule out worsened asthma/COPD at this time\par Repeat PFTs to evaluate airways disease at this time

## 2022-04-11 NOTE — CONSULT LETTER
[Dear  ___] : Dear  [unfilled], [FreeTextEntry1] : I had the pleasure of evaluating your patient, KATHY KHAN , in the office today.  Please review my consultation and evaluation report that follows below.  Please do not hesitate to call me if further information is necessary or if you wish to discuss ongoing care or diagnostic work-up.   \par I very much appreciate your referral and it is a privilege to be able to provide care for your patient.\par \par Sincerely,\par  \par Daniel Bolton MD, MHCM, FACP, FATOUMATA-C\par Pulmonary Medicine\par  of Medicine\par [employer] [facility] [facility] [employer] [facility]\par [email or web address]\par \par [facility] [place]\par [address]\[place]  [address]\par    Fax \par \par [facility]\par [address]\par [place], [place] \par \par

## 2022-04-27 ENCOUNTER — APPOINTMENT (OUTPATIENT)
Dept: CT IMAGING | Facility: CLINIC | Age: 77
End: 2022-04-27
Payer: COMMERCIAL

## 2022-04-27 PROCEDURE — 71250 CT THORAX DX C-: CPT

## 2022-06-06 ENCOUNTER — APPOINTMENT (OUTPATIENT)
Dept: PULMONOLOGY | Facility: CLINIC | Age: 77
End: 2022-06-06
Payer: COMMERCIAL

## 2022-06-06 VITALS
BODY MASS INDEX: 26.07 KG/M2 | WEIGHT: 176 LBS | SYSTOLIC BLOOD PRESSURE: 130 MMHG | RESPIRATION RATE: 14 BRPM | OXYGEN SATURATION: 96 % | HEIGHT: 69 IN | HEART RATE: 63 BPM | DIASTOLIC BLOOD PRESSURE: 80 MMHG

## 2022-06-06 VITALS — HEIGHT: 69 IN | WEIGHT: 176 LBS | BODY MASS INDEX: 26.07 KG/M2

## 2022-06-06 DIAGNOSIS — R06.00 DYSPNEA, UNSPECIFIED: ICD-10-CM

## 2022-06-06 LAB — SARS-COV-2 N GENE NPH QL NAA+PROBE: NOT DETECTED

## 2022-06-06 PROCEDURE — 85018 HEMOGLOBIN: CPT | Mod: QW

## 2022-06-06 PROCEDURE — 94010 BREATHING CAPACITY TEST: CPT

## 2022-06-06 PROCEDURE — 94727 GAS DIL/WSHOT DETER LNG VOL: CPT

## 2022-06-06 PROCEDURE — 99214 OFFICE O/P EST MOD 30 MIN: CPT | Mod: 25

## 2022-06-06 PROCEDURE — 94729 DIFFUSING CAPACITY: CPT

## 2022-06-06 RX ORDER — FLUTICASONE PROPIONATE AND SALMETEROL 50; 250 UG/1; UG/1
250-50 POWDER RESPIRATORY (INHALATION) TWICE DAILY
Qty: 1 | Refills: 5 | Status: DISCONTINUED | COMMUNITY
Start: 2018-09-06 | End: 2022-06-06

## 2022-06-06 NOTE — HISTORY OF PRESENT ILLNESS
[TextBox_4] : 78 yo man with dyspnea\par PFT showed mild obstruction\par Not taking advair\par Rec advair  regularly and proventil \par RTC 2 months\par Repeat CT chest in 1 yr for 5 mm lesion RUL stable since 2021

## 2022-06-10 RX ORDER — FLUTICASONE PROPIONATE AND SALMETEROL 250; 50 UG/1; UG/1
250-50 POWDER RESPIRATORY (INHALATION)
Qty: 3 | Refills: 0 | Status: ACTIVE | COMMUNITY
Start: 2022-06-06 | End: 1900-01-01

## 2022-06-10 RX ORDER — ALBUTEROL SULFATE 90 UG/1
108 (90 BASE) INHALANT RESPIRATORY (INHALATION)
Qty: 3 | Refills: 3 | Status: ACTIVE | COMMUNITY
Start: 2022-06-06 | End: 1900-01-01

## 2022-06-24 ENCOUNTER — APPOINTMENT (OUTPATIENT)
Dept: DERMATOLOGY | Facility: CLINIC | Age: 77
End: 2022-06-24
Payer: MEDICARE

## 2022-06-24 PROCEDURE — 99213 OFFICE O/P EST LOW 20 MIN: CPT

## 2022-07-20 ENCOUNTER — APPOINTMENT (OUTPATIENT)
Dept: HEMATOLOGY ONCOLOGY | Facility: CLINIC | Age: 77
End: 2022-07-20

## 2022-07-20 VITALS
SYSTOLIC BLOOD PRESSURE: 120 MMHG | TEMPERATURE: 97.5 F | BODY MASS INDEX: 25.99 KG/M2 | WEIGHT: 176 LBS | OXYGEN SATURATION: 98 % | DIASTOLIC BLOOD PRESSURE: 70 MMHG | HEART RATE: 67 BPM

## 2022-07-20 PROCEDURE — 99213 OFFICE O/P EST LOW 20 MIN: CPT

## 2022-07-20 PROCEDURE — 85025 COMPLETE CBC W/AUTO DIFF WBC: CPT

## 2022-07-20 NOTE — RESULTS/DATA
[FreeTextEntry1] : CBC 7/20/22\par WBC 18.8\par HGB 12.4\par HCT 37.3\par .0\par ALC 15.67\par ANC 2.18\par

## 2022-07-20 NOTE — REVIEW OF SYSTEMS
[SOB on Exertion] : shortness of breath during exertion [Negative] : Allergic/Immunologic [Shortness Of Breath] : no shortness of breath [Cough] : no cough [FreeTextEntry2] : see hpi [FreeTextEntry6] : Progressive SOB on exertion (after one flight of stairs) and occasionally at rest

## 2022-07-20 NOTE — PHYSICAL EXAM
[Restricted in physically strenuous activity but ambulatory and able to carry out work of a light or sedentary nature] : Status 1- Restricted in physically strenuous activity but ambulatory and able to carry out work of a light or sedentary nature, e.g., light house work, office work [Normal] : affect appropriate [de-identified] : +many L inguinal sub-cm nodes

## 2022-07-20 NOTE — ASSESSMENT
[FreeTextEntry1] : ARNOLDO is a 78 y/o man with multiple chronic medical problems with treatment naive, Stage 1 CLL diagnosed in 2005.  His CLL has been completely stable for over a decade, we discussed that there is a possibility that he may never require any therapy in his lifetime, however, he remains at increased risk for secondary malignancies (hence need for age-appropriate cancer screening) and infections (need for prophylactic vaccines).\par He will require a TAVR in the coming months.\par \par \par Plan:\par 1) CLL - Stage 1, stable. Cont active surveillance.\par 2) MGUS - SPEP and SHARLENE both WNL. No need for further w/u at this time.\par 3) Pancreatic cysts - continue follow up w/ Dr. Milian \par 4) HCM - f/u w/ PCP, Urologist, derm. Discussed Evus[employer] with patient and fact sheet provided. He will discuss with his cardiologist and let us know if he is interested in receiving it.\par \par RTO in 4-6 months, sooner if necessary

## 2022-07-20 NOTE — HISTORY OF PRESENT ILLNESS
[Disease:__________________________] : Disease: [unfilled] [de-identified] : Neville Montes is a 76y/o man w/ PMH of pancreatic cysts, DM2, HTN, HLD, CAD s/p stent x3, h/o MI x 2, iron deficiency anemia, enlarged prostate, diverticulosis, nephrolithiasis, and CLL. \par \par He presented for initial evaluation of CLL diagnosed in 2005 (Stage 0) to our clinic Nov 2019. He was previously under the care of Dr. Brad Baum, but he got concerned that he was getting too many insurance denials for the repeated imaging scans that he was getting, so he changed his medical insurance. Although he has had asymptomatic disease for over a decade, dr Baum was doing frequent PET scans as detailed below (and scanned to allscripts). He had w/u for possible MM in 2014 after UPEP (+) Lambda Bence Navarrete Proteins, but to our knowledge, the patient was not diagnosed with MM. Repeat testing done here was wnl.\par \par He gets repeated MRI abdomen to evaluate known pancreatic cysts (follows doctor Maicol at [facility]). Last MRI showed uncinate lesion increased in size, worsened hepatomegaly and hepatic steatosis--currently under monitoring by GI MD at [facility]. He sees him once a year. \par \par He routinely follows GI MD, Pulm MD, and PCP. \par \par ***Additional prior imaging/work-up includes:\par - CT chest 3/15/2019 w/ numerous sub-centimeter b/l axillary and mediastinal nodes some appear more prominent than 10/2018. 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 Stable nodular density at right apex. \par -10/27/2018: CT scan w/o contrast showed stable 3mm RUL lung nodule unchanged from 8/29/2017 w/o suspicious lung nodule or consolidation. 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 Mild hepatomegaly and diffuse hepatic steatosis.\par - 8/29/2017: CT C/A/P w/ IV contrast showed chest w/o adenopathy, numerous stable shotty axillary and mediastinal nodes unchanged since 2014, stable minimal linear scarring at lung bases. A/P w/ vaguely discernible 8mm pancreas cyst and upper limit of nml caliber adjacent pancreas duct.. No adenopathy or pathologic fluid collection. Stable LT lower pole stones. Enlarged prostate projects into urinary bladder base. CT soft tissue neck w/o pathologic cervical lymph adenopathy. ABD US showed mild splenomegaly, pancreas obscured by bowel gas, small RT renal cyst. \par - 12/2014: PET scan w/ small hypermetabolic focus at level of LT common Iliac artery and LT  axillary nodes that are no longer hypermetabolic and are sub-centimeter in size, splenomegaly, and fatty liver.\par - 10/13/2013: PET/CT scan w/ multiple hypermetabolic LT axillary nodes\par \par HCM: Per review of records, h/o mild elevation of CEA (2013). Last c-scope was in 5/2017 w/ resection of one tubular adenoma. No recent PSA. Flu vax 10/19, Shingles and PCV23 ~3-4 years ago.\par \par CLL Hx: ARNOLDO was referred to Dr. Baum in 2005 by his PCP for elevated WBC ~10k-15k. CLL diagnosis was confirmed by BM bx. He has been asymptomatic since diagnosis.\par \par  [de-identified] : -2008: BM bx CLL and 11/2013: axillary lymph node bx showed CLL/SLL by flow cytometry and IHC [FreeTextEntry1] : Treatment naive [de-identified] : 7/20/22: Mr. Montes presents today for CLL (Stage 1) follow-up. \par Since last follow up he has been following up with his cardiologist for evaluation for TAVR procedure. He also followed up with his pulmonogist for history of asthma and pulmonary nodules-he has yearlyt repeat CT chest and nodules have been stable in size. In addition, he has 6 month abd MRI with his GI to evaluate pancreatic cyst, which patient report has been stable in size. \par Patient report he has been feeling well overall and ha sno complaints. His fatigue remains stable.  Denies  fevers, worsening night sweats (he continues to have sweats, about once every few weeks). Pt denies diarrhea/constipation, or abdominal pain. Denies enlarged/bothersome lymph nodes. His appetite continues to be stable. No recent/recurrent infections. No new meds. He has received 4 doses of COVID 19 vaccine.\par \par

## 2022-07-21 LAB
ALBUMIN SERPL ELPH-MCNC: 4.6 G/DL
ALP BLD-CCNC: 50 U/L
ALT SERPL-CCNC: 32 U/L
ANION GAP SERPL CALC-SCNC: 14 MMOL/L
AST SERPL-CCNC: 24 U/L
BILIRUB SERPL-MCNC: 0.4 MG/DL
BUN SERPL-MCNC: 25 MG/DL
CALCIUM SERPL-MCNC: 9.3 MG/DL
CHLORIDE SERPL-SCNC: 103 MMOL/L
CO2 SERPL-SCNC: 26 MMOL/L
COVID-19 SPIKE DOMAIN ANTIBODY INTERPRETATION: POSITIVE
CREAT SERPL-MCNC: 1.28 MG/DL
EGFR: 58 ML/MIN/1.73M2
GLUCOSE SERPL-MCNC: 175 MG/DL
LDH SERPL-CCNC: 167 U/L
MAGNESIUM SERPL-MCNC: 1.3 MG/DL
PHOSPHATE SERPL-MCNC: 2.9 MG/DL
POTASSIUM SERPL-SCNC: 4.3 MMOL/L
PROT SERPL-MCNC: 6.6 G/DL
SARS-COV-2 AB SERPL IA-ACNC: >250 U/ML
SODIUM SERPL-SCNC: 143 MMOL/L
URATE SERPL-MCNC: 7 MG/DL

## 2022-07-28 ENCOUNTER — NON-APPOINTMENT (OUTPATIENT)
Age: 77
End: 2022-07-28

## 2022-08-12 ENCOUNTER — APPOINTMENT (OUTPATIENT)
Dept: PULMONOLOGY | Facility: CLINIC | Age: 77
End: 2022-08-12

## 2022-08-12 VITALS
WEIGHT: 180 LBS | SYSTOLIC BLOOD PRESSURE: 122 MMHG | OXYGEN SATURATION: 97 % | HEART RATE: 70 BPM | BODY MASS INDEX: 26.66 KG/M2 | HEIGHT: 69 IN | RESPIRATION RATE: 16 BRPM | DIASTOLIC BLOOD PRESSURE: 68 MMHG

## 2022-08-12 PROCEDURE — 99214 OFFICE O/P EST MOD 30 MIN: CPT

## 2022-08-12 NOTE — ASSESSMENT
[FreeTextEntry1] : 78 yo man with previous asthma/COPD, retired \par Still hasn’t started his advair and he takes albuterol intermittently\par Hx CLL for 10 years, no treatment\par MGUS\par CAD, s/p stenting\par Known Aortic stenosis and being evaluated for TAVR\par s/p CVA\par On anticoagulation\par \par Imp\par 78 yo man with previously noted obstructive airways disease\par Still believe he may benefit from reinstitution of advair, \par Eventually may require TAVR--would like to optimize his status\par RTC 4 months

## 2022-08-12 NOTE — HISTORY OF PRESENT ILLNESS
[TextBox_4] : 76 yo man with previous asthma/COPD, retired \par Still hasn’t started his advair and he takes albuterol intermittently\par Hx CLL for 10 years, no treatment\par MGUS\par CAD, s/p stenting\par Known Aortic stenosis and being evaluated for TAVR\par s/p CVA\par On anticoagulation

## 2022-08-24 ENCOUNTER — APPOINTMENT (OUTPATIENT)
Dept: DERMATOLOGY | Facility: CLINIC | Age: 77
End: 2022-08-24

## 2022-08-24 PROCEDURE — 17110 DESTRUCTION B9 LES UP TO 14: CPT

## 2022-08-24 PROCEDURE — 99213 OFFICE O/P EST LOW 20 MIN: CPT | Mod: 25

## 2022-11-04 ENCOUNTER — NON-APPOINTMENT (OUTPATIENT)
Age: 77
End: 2022-11-04

## 2022-12-12 ENCOUNTER — APPOINTMENT (OUTPATIENT)
Dept: PULMONOLOGY | Facility: CLINIC | Age: 77
End: 2022-12-12

## 2022-12-12 VITALS
OXYGEN SATURATION: 97 % | HEIGHT: 69 IN | SYSTOLIC BLOOD PRESSURE: 124 MMHG | HEART RATE: 68 BPM | WEIGHT: 176 LBS | BODY MASS INDEX: 26.07 KG/M2 | DIASTOLIC BLOOD PRESSURE: 62 MMHG | RESPIRATION RATE: 16 BRPM

## 2022-12-12 DIAGNOSIS — J45.909 UNSPECIFIED ASTHMA, UNCOMPLICATED: ICD-10-CM

## 2022-12-12 PROCEDURE — 99214 OFFICE O/P EST MOD 30 MIN: CPT

## 2022-12-12 NOTE — ASSESSMENT
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

## 2022-12-12 NOTE — HISTORY OF PRESENT ILLNESS
[TextBox_4] : 76 yo man with asthma/COPD retired \par Hx CLL, no Rx, MGUS noted\par CAD s/p stenting\par Ao stenosis--followed but valve area is over 1.0 at this time\par s/p CVA On anticoagulation\par \par CT in April showed a 5 mm RUL nodule\par Since seen , patient has not elected to use advair or any inhalers at all\par He says he feels fine

## 2022-12-15 ENCOUNTER — NON-APPOINTMENT (OUTPATIENT)
Age: 77
End: 2022-12-15

## 2023-01-17 ENCOUNTER — APPOINTMENT (OUTPATIENT)
Dept: HEMATOLOGY ONCOLOGY | Facility: CLINIC | Age: 78
End: 2023-01-17

## 2023-01-26 ENCOUNTER — OUTPATIENT (OUTPATIENT)
Dept: OUTPATIENT SERVICES | Facility: HOSPITAL | Age: 78
LOS: 1 days | Discharge: HOME | End: 2023-01-26

## 2023-01-26 ENCOUNTER — NON-APPOINTMENT (OUTPATIENT)
Age: 78
End: 2023-01-26

## 2023-01-26 DIAGNOSIS — Z95.5 PRESENCE OF CORONARY ANGIOPLASTY IMPLANT AND GRAFT: Chronic | ICD-10-CM

## 2023-01-26 DIAGNOSIS — Z01.812 ENCOUNTER FOR PREPROCEDURAL LABORATORY EXAMINATION: ICD-10-CM

## 2023-01-31 ENCOUNTER — APPOINTMENT (OUTPATIENT)
Dept: HEMATOLOGY ONCOLOGY | Facility: CLINIC | Age: 78
End: 2023-01-31
Payer: COMMERCIAL

## 2023-01-31 ENCOUNTER — RESULT REVIEW (OUTPATIENT)
Age: 78
End: 2023-01-31

## 2023-01-31 ENCOUNTER — APPOINTMENT (OUTPATIENT)
Dept: HEMATOLOGY ONCOLOGY | Facility: CLINIC | Age: 78
End: 2023-01-31

## 2023-01-31 VITALS
HEART RATE: 70 BPM | DIASTOLIC BLOOD PRESSURE: 56 MMHG | WEIGHT: 182.1 LBS | OXYGEN SATURATION: 96 % | TEMPERATURE: 96.4 F | BODY MASS INDEX: 26.89 KG/M2 | RESPIRATION RATE: 16 BRPM | SYSTOLIC BLOOD PRESSURE: 144 MMHG

## 2023-01-31 LAB
ALBUMIN SERPL ELPH-MCNC: 4.4 G/DL
ALP BLD-CCNC: 56 U/L
ALT SERPL-CCNC: 21 U/L
ANION GAP SERPL CALC-SCNC: 13 MMOL/L
AST SERPL-CCNC: 22 U/L
BASOPHILS # BLD AUTO: 0 K/UL — SIGNIFICANT CHANGE UP (ref 0–0.2)
BASOPHILS NFR BLD AUTO: 0 % — SIGNIFICANT CHANGE UP (ref 0–2)
BILIRUB SERPL-MCNC: 0.5 MG/DL
BUN SERPL-MCNC: 23 MG/DL
CALCIUM SERPL-MCNC: 9.3 MG/DL
CHLORIDE SERPL-SCNC: 102 MMOL/L
CO2 SERPL-SCNC: 26 MMOL/L
CREAT SERPL-MCNC: 1.22 MG/DL
EGFR: 61 ML/MIN/1.73M2
EOSINOPHIL # BLD AUTO: 0.17 K/UL — SIGNIFICANT CHANGE UP (ref 0–0.5)
EOSINOPHIL NFR BLD AUTO: 1 % — SIGNIFICANT CHANGE UP (ref 0–6)
GLUCOSE SERPL-MCNC: 136 MG/DL
HCT VFR BLD CALC: 35.1 % — LOW (ref 39–50)
HGB BLD-MCNC: 11.6 G/DL — LOW (ref 13–17)
LDH SERPL-CCNC: 184 U/L
LYMPHOCYTES # BLD AUTO: 13.53 K/UL — HIGH (ref 1–3.3)
LYMPHOCYTES # BLD AUTO: 78 % — HIGH (ref 13–44)
LYMPHOCYTES # SPEC AUTO: 4 % — HIGH (ref 0–0)
MAGNESIUM SERPL-MCNC: 1.5 MG/DL
MCHC RBC-ENTMCNC: 31 PG — SIGNIFICANT CHANGE UP (ref 27–34)
MCHC RBC-ENTMCNC: 33 G/DL — SIGNIFICANT CHANGE UP (ref 32–36)
MCV RBC AUTO: 93.9 FL — SIGNIFICANT CHANGE UP (ref 80–100)
MONOCYTES # BLD AUTO: 0 K/UL — SIGNIFICANT CHANGE UP (ref 0–0.9)
MONOCYTES NFR BLD AUTO: 0 % — LOW (ref 2–14)
NEUTROPHILS # BLD AUTO: 2.95 K/UL — SIGNIFICANT CHANGE UP (ref 1.8–7.4)
NEUTROPHILS NFR BLD AUTO: 17 % — LOW (ref 43–77)
NRBC # BLD: 0 /100 — SIGNIFICANT CHANGE UP (ref 0–0)
NRBC # BLD: SIGNIFICANT CHANGE UP /100 WBCS (ref 0–0)
PHOSPHATE SERPL-MCNC: 3.2 MG/DL
PLAT MORPH BLD: NORMAL — SIGNIFICANT CHANGE UP
PLATELET # BLD AUTO: 120 K/UL — LOW (ref 150–400)
POTASSIUM SERPL-SCNC: 3.9 MMOL/L
PROT SERPL-MCNC: 6.1 G/DL
RBC # BLD: 3.74 M/UL — LOW (ref 4.2–5.8)
RBC # FLD: 13.1 % — SIGNIFICANT CHANGE UP (ref 10.3–14.5)
RBC BLD AUTO: SIGNIFICANT CHANGE UP
SMUDGE CELLS # BLD: PRESENT — SIGNIFICANT CHANGE UP
SODIUM SERPL-SCNC: 141 MMOL/L
URATE SERPL-MCNC: 6.1 MG/DL
WBC # BLD: 17.34 K/UL — HIGH (ref 3.8–10.5)
WBC # FLD AUTO: 17.34 K/UL — HIGH (ref 3.8–10.5)

## 2023-01-31 PROCEDURE — 99213 OFFICE O/P EST LOW 20 MIN: CPT | Mod: GC

## 2023-01-31 NOTE — REVIEW OF SYSTEMS
[SOB on Exertion] : shortness of breath during exertion [Negative] : Heme/Lymph [Night Sweats] : night sweats [Fatigue] : fatigue [Fever] : no fever [Chills] : no chills [Shortness Of Breath] : no shortness of breath [Cough] : no cough [FreeTextEntry6] : Progressive SOB on exertion (after one flight of stairs) and occasionally at rest

## 2023-01-31 NOTE — HISTORY OF PRESENT ILLNESS
[Disease:__________________________] : Disease: [unfilled] [de-identified] : Neville Montes is a 76y/o man w/ PMH of pancreatic cysts, DM2, HTN, HLD, CAD s/p stent x3, h/o MI x 2, iron deficiency anemia, enlarged prostate, diverticulosis, nephrolithiasis, and CLL. 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 He was previously under the care of Dr. Brad Baum, but he got concerned that he was getting too many insurance denials for the repeated imaging scans that he was getting, so he changed his medical insurance. Although he has had asymptomatic disease for over a decade, dr Baum was doing frequent PET scans as detailed below (and scanned to allscripts). He had w/u for possible MM in 2014 after UPEP (+) Lambda Bence Navarrete Proteins, but to our knowledge, the patient was not diagnosed with MM. Repeat testing done here was wnl.\par \par He gets repeated MRI abdomen to evaluate known pancreatic cysts (follows doctor Maicol at [facility]). Last MRI showed uncinate lesion increased in size, worsened hepatomegaly and hepatic steatosis--currently under monitoring by GI MD at [facility]. He sees him once a year. \par \par He routinely follows GI MD, Pulm MD, and PCP. \par \par ***Additional prior imaging/work-up includes:\par - CT chest 3/15/2019 w/ numerous sub-centimeter b/l axillary and mediastinal nodes some appear more prominent than 10/2018. 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 Stable nodular density at right apex. \par -10/27/2018: CT scan w/o contrast showed stable 3mm RUL lung nodule unchanged from 8/29/2017 w/o suspicious lung nodule or consolidation. 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 Mild hepatomegaly and diffuse hepatic steatosis.\par - 8/29/2017: CT C/A/P w/ IV contrast showed chest w/o adenopathy, numerous stable shotty axillary and mediastinal nodes unchanged since 2014, stable minimal linear scarring at lung bases. A/P w/ vaguely discernible 8mm pancreas cyst and upper limit of nml caliber adjacent pancreas duct.. No adenopathy or pathologic fluid collection. Stable LT lower pole stones. Enlarged prostate projects into urinary bladder base. CT soft tissue neck w/o pathologic cervical lymph adenopathy. ABD US showed mild splenomegaly, pancreas obscured by bowel gas, small RT renal cyst. \par - 12/2014: PET scan w/ small hypermetabolic focus at level of LT common Iliac artery and LT  axillary nodes that are no longer hypermetabolic and are sub-centimeter in size, splenomegaly, and fatty liver.\par - 10/13/2013: PET/CT scan w/ multiple hypermetabolic LT axillary nodes\par \par HCM: Per review of records, h/o mild elevation of CEA (2013). Last c-scope was in 5/2017 w/ resection of one tubular adenoma. No recent PSA. Flu vax 10/19, Shingles and PCV23 ~3-4 years ago.\par \par CLL Hx: ARNOLDO was referred to Dr. Baum in 2005 by his PCP for elevated WBC ~10k-15k. CLL diagnosis was confirmed by BM bx. He has been asymptomatic since diagnosis.\par \par  [de-identified] : -2008: BM bx CLL and 11/2013: axillary lymph node bx showed CLL/SLL by flow cytometry and IHC [FreeTextEntry1] : Treatment naive [de-identified] : 1/31/23: Mr. Montes presents today for CLL (Stage 1) follow-up. He was found to have pancreatic cysts recently, for which he had an endoscopy yesterday 1//30/22 (follows with Dr. Edgardo Chew at [facility]). He continues to follow up with his pulmonologist for his  history of asthma and pulmonary nodules-he has yearly repeat CT chest and nodules have been stable in size. He was suppose to have a TAVR last year, but after further evaluation it was deemed not necessary at the moment. He stated that he was found to have a questionable finding on his bladder for which he has an appointment with Urology at [facility]. Since last visit patient has been doing well and has no complaints. His fatigue remains stable.  Denies  fevers, worsening night sweats (he continues to have sweats, about once or twice per week). Pt denies diarrhea/constipation, or abdominal pain. Denies enlarged/bothersome lymph nodes. His appetite continues to be stable. No recent/recurrent infections.\par \par

## 2023-01-31 NOTE — PHYSICAL EXAM
[Restricted in physically strenuous activity but ambulatory and able to carry out work of a light or sedentary nature] : Status 1- Restricted in physically strenuous activity but ambulatory and able to carry out work of a light or sedentary nature, e.g., light house work, office work [Normal] : grossly intact [de-identified] : +many L inguinal sub-cm nodes

## 2023-01-31 NOTE — ASSESSMENT
[FreeTextEntry1] : ARNOLDO is a 76 y/o man with multiple chronic medical problems with treatment naive, Stage 1 CLL diagnosed in 2005.  His CLL has been completely stable for over a decade, we discussed that there is a possibility that he may never require any therapy in his lifetime, however, he remains at increased risk for secondary malignancies (hence need for age-appropriate cancer screening) and infections (need for prophylactic vaccines).\par \par Plan:\par 1) CLL - Stage 1, stable. Cont active surveillance. Today WBC is 17, Hgb 11.6, Plt 120. Given decrease in Hgb will send anemia work-up\par 2) MGUS - SPEP and SHARLENE both WNL. No need for further w/u at this time.\par 3) Pancreatic cysts - continue follow up w/ Dr. Edgardo May at [facility] \par 4) HCM - f/u w/ PCP, Urologist, derm (last seen a few months ago), cardiology. Reports last colonoscopy was about 1 year ago. \par \par RTO in 4 months, sooner if necessary\par \par Patient seen and discussed with Dr. Vidal\par Audi Villegas MD, PGY5, Heme/Onc Fellow

## 2023-02-01 LAB
DEPRECATED KAPPA LC FREE/LAMBDA SER: 0.76 RATIO
IGA SER QL IEP: 95 MG/DL
IGG SER QL IEP: 770 MG/DL
IGM SER QL IEP: 37 MG/DL
KAPPA LC CSF-MCNC: 2.51 MG/DL
KAPPA LC SERPL-MCNC: 1.91 MG/DL

## 2023-02-03 LAB
ALBUMIN MFR SERPL ELPH: 64.5 %
ALBUMIN SERPL-MCNC: 4 G/DL
ALBUMIN/GLOB SERPL: 1.8 RATIO
ALPHA1 GLOB MFR SERPL ELPH: 3.9 %
ALPHA1 GLOB SERPL ELPH-MCNC: 0.2 G/DL
ALPHA2 GLOB MFR SERPL ELPH: 10.7 %
ALPHA2 GLOB SERPL ELPH-MCNC: 0.7 G/DL
B-GLOBULIN MFR SERPL ELPH: 10.2 %
B-GLOBULIN SERPL ELPH-MCNC: 0.6 G/DL
GAMMA GLOB FLD ELPH-MCNC: 0.7 G/DL
GAMMA GLOB MFR SERPL ELPH: 10.7 %
INTERPRETATION SERPL IEP-IMP: NORMAL
M PROTEIN SPEC IFE-MCNC: NORMAL
PROT SERPL-MCNC: 6.2 G/DL
PROT SERPL-MCNC: 6.2 G/DL

## 2023-04-12 ENCOUNTER — APPOINTMENT (OUTPATIENT)
Dept: CT IMAGING | Facility: CLINIC | Age: 78
End: 2023-04-12

## 2023-04-18 ENCOUNTER — APPOINTMENT (OUTPATIENT)
Dept: CT IMAGING | Facility: CLINIC | Age: 78
End: 2023-04-18
Payer: COMMERCIAL

## 2023-04-18 PROCEDURE — 71250 CT THORAX DX C-: CPT

## 2023-04-24 ENCOUNTER — APPOINTMENT (OUTPATIENT)
Dept: PULMONOLOGY | Facility: CLINIC | Age: 78
End: 2023-04-24
Payer: COMMERCIAL

## 2023-04-24 VITALS
RESPIRATION RATE: 16 BRPM | SYSTOLIC BLOOD PRESSURE: 148 MMHG | OXYGEN SATURATION: 97 % | HEART RATE: 68 BPM | DIASTOLIC BLOOD PRESSURE: 68 MMHG

## 2023-04-24 DIAGNOSIS — R91.1 SOLITARY PULMONARY NODULE: ICD-10-CM

## 2023-04-24 DIAGNOSIS — J45.30 MILD PERSISTENT ASTHMA, UNCOMPLICATED: ICD-10-CM

## 2023-04-24 PROCEDURE — 99214 OFFICE O/P EST MOD 30 MIN: CPT

## 2023-04-24 RX ORDER — AMLODIPINE BESYLATE 2.5 MG/1
2.5 TABLET ORAL
Qty: 90 | Refills: 0 | Status: DISCONTINUED | COMMUNITY
Start: 2021-11-18 | End: 2023-04-24

## 2023-04-24 RX ORDER — APIXABAN 5 MG/1
5 TABLET, FILM COATED ORAL
Refills: 0 | Status: ACTIVE | COMMUNITY

## 2023-04-24 NOTE — HISTORY OF PRESENT ILLNESS
[TextBox_4] : 76 yo man with asthma/COPD retired \par Hx CLL, no Rx, MGUS noted\par CAD s/p stenting\par Ao stenosis--followed but valve area is over 1.0 at this time\par s/p CVA On anticoagulation\par CT in April 2022 showed a 5 mm RUL nodule\par \par Interim\par Patient continues to feel fine--he does not take any inhalers and still does not think he needs them\par \par Sent for f/u CT done in April 23:\par Nodule noted in RUL has decreased in size from 5 to 3 mm and felt to represent inspissated secretions and not a mass\par

## 2023-05-10 ENCOUNTER — OUTPATIENT (OUTPATIENT)
Dept: OUTPATIENT SERVICES | Facility: HOSPITAL | Age: 78
LOS: 1 days | Discharge: HOME | End: 2023-05-10

## 2023-05-10 DIAGNOSIS — Z95.5 PRESENCE OF CORONARY ANGIOPLASTY IMPLANT AND GRAFT: Chronic | ICD-10-CM

## 2023-05-10 DIAGNOSIS — Z01.812 ENCOUNTER FOR PREPROCEDURAL LABORATORY EXAMINATION: ICD-10-CM

## 2023-05-22 ENCOUNTER — RESULT REVIEW (OUTPATIENT)
Age: 78
End: 2023-05-22

## 2023-05-22 ENCOUNTER — APPOINTMENT (OUTPATIENT)
Dept: HEMATOLOGY ONCOLOGY | Facility: CLINIC | Age: 78
End: 2023-05-22
Payer: COMMERCIAL

## 2023-05-22 ENCOUNTER — APPOINTMENT (OUTPATIENT)
Dept: HEMATOLOGY ONCOLOGY | Facility: CLINIC | Age: 78
End: 2023-05-22

## 2023-05-22 VITALS
BODY MASS INDEX: 26.5 KG/M2 | SYSTOLIC BLOOD PRESSURE: 175 MMHG | OXYGEN SATURATION: 97 % | WEIGHT: 179.43 LBS | RESPIRATION RATE: 16 BRPM | DIASTOLIC BLOOD PRESSURE: 74 MMHG | HEART RATE: 75 BPM | TEMPERATURE: 96.6 F

## 2023-05-22 LAB
ALBUMIN SERPL ELPH-MCNC: 4.5 G/DL
ALP BLD-CCNC: 63 U/L
ALT SERPL-CCNC: 20 U/L
ANION GAP SERPL CALC-SCNC: 13 MMOL/L
AST SERPL-CCNC: 22 U/L
BASOPHILS # BLD AUTO: 0.22 K/UL — HIGH (ref 0–0.2)
BASOPHILS NFR BLD AUTO: 1 % — SIGNIFICANT CHANGE UP (ref 0–2)
BILIRUB SERPL-MCNC: 0.5 MG/DL
BUN SERPL-MCNC: 23 MG/DL
CALCIUM SERPL-MCNC: 9.4 MG/DL
CHLORIDE SERPL-SCNC: 99 MMOL/L
CO2 SERPL-SCNC: 28 MMOL/L
CREAT SERPL-MCNC: 1.18 MG/DL
EGFR: 64 ML/MIN/1.73M2
EOSINOPHIL # BLD AUTO: 0.43 K/UL — SIGNIFICANT CHANGE UP (ref 0–0.5)
EOSINOPHIL NFR BLD AUTO: 2 % — SIGNIFICANT CHANGE UP (ref 0–6)
FERRITIN SERPL-MCNC: 49 NG/ML
FOLATE SERPL-MCNC: >20 NG/ML
GLUCOSE SERPL-MCNC: 163 MG/DL
HCT VFR BLD CALC: 36.8 % — LOW (ref 39–50)
HGB BLD-MCNC: 12.1 G/DL — LOW (ref 13–17)
IRON SATN MFR SERPL: 34 %
IRON SERPL-MCNC: 118 UG/DL
LDH SERPL-CCNC: 177 U/L
LYMPHOCYTES # BLD AUTO: 17.83 K/UL — HIGH (ref 1–3.3)
LYMPHOCYTES # BLD AUTO: 82 % — HIGH (ref 13–44)
LYMPHOCYTES # SPEC AUTO: 4 % — HIGH (ref 0–0)
MAGNESIUM SERPL-MCNC: 1.7 MG/DL
MCHC RBC-ENTMCNC: 30.6 PG — SIGNIFICANT CHANGE UP (ref 27–34)
MCHC RBC-ENTMCNC: 32.9 G/DL — SIGNIFICANT CHANGE UP (ref 32–36)
MCV RBC AUTO: 92.9 FL — SIGNIFICANT CHANGE UP (ref 80–100)
MONOCYTES # BLD AUTO: 0.43 K/UL — SIGNIFICANT CHANGE UP (ref 0–0.9)
MONOCYTES NFR BLD AUTO: 2 % — SIGNIFICANT CHANGE UP (ref 2–14)
NEUTROPHILS # BLD AUTO: 1.96 K/UL — SIGNIFICANT CHANGE UP (ref 1.8–7.4)
NEUTROPHILS NFR BLD AUTO: 9 % — LOW (ref 43–77)
NRBC # BLD: 0 /100 — SIGNIFICANT CHANGE UP (ref 0–0)
NRBC # BLD: SIGNIFICANT CHANGE UP /100 WBCS (ref 0–0)
PHOSPHATE SERPL-MCNC: 3 MG/DL
PLAT MORPH BLD: NORMAL — SIGNIFICANT CHANGE UP
PLATELET # BLD AUTO: 118 K/UL — LOW (ref 150–400)
POTASSIUM SERPL-SCNC: 4.6 MMOL/L
PROT SERPL-MCNC: 6.6 G/DL
RBC # BLD: 3.96 M/UL — LOW (ref 4.2–5.8)
RBC # FLD: 13.4 % — SIGNIFICANT CHANGE UP (ref 10.3–14.5)
RBC BLD AUTO: SIGNIFICANT CHANGE UP
SMUDGE CELLS # BLD: PRESENT — SIGNIFICANT CHANGE UP
SODIUM SERPL-SCNC: 140 MMOL/L
TIBC SERPL-MCNC: 347 UG/DL
UIBC SERPL-MCNC: 229 UG/DL
URATE SERPL-MCNC: 6.2 MG/DL
VIT B12 SERPL-MCNC: 790 PG/ML
WBC # BLD: 21.74 K/UL — HIGH (ref 3.8–10.5)
WBC # FLD AUTO: 21.74 K/UL — HIGH (ref 3.8–10.5)

## 2023-05-22 PROCEDURE — 99213 OFFICE O/P EST LOW 20 MIN: CPT

## 2023-05-22 NOTE — HISTORY OF PRESENT ILLNESS
[Disease:__________________________] : Disease: [unfilled] [de-identified] : Neville Montes is a 77y/o man w/ PMH of pancreatic cysts, DM2, HTN, HLD, CAD s/p stent x3, h/o MI x 2, iron deficiency anemia, enlarged prostate, diverticulosis, nephrolithiasis, and CLL. 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 He was previously under the care of Dr. Brad Baum, but he got concerned that he was getting too many insurance denials for the repeated imaging scans that he was getting, so he changed his medical insurance. Although he has had asymptomatic disease for over a decade, Dr Baum was doing frequent PET scans as detailed below (and scanned to allscripts). He had w/u for possible MM in 2014 after UPEP (+) Lambda Bence Navarrete Proteins, but to our knowledge, the patient was not diagnosed with MM. Repeat testing done here was wnl.\par \par He gets repeated MRI abdomen to evaluate known pancreatic cysts (initially followed Dr. Milian at [facility]). Last MRI showed uncinate lesion increased in size, worsened hepatomegaly and hepatic steatosis--currently under monitoring by GI MD at [facility]. He sees him once a year. \par \par He routinely follows GI MD, Pulm MD, cardiology MD and PCP. \par \par ***Additional prior imaging/work-up includes:\par - CT chest 3/15/2019 w/ numerous sub-centimeter b/l axillary and mediastinal nodes some appear more prominent than 10/2018. 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 Stable nodular density at right apex. \par -10/27/2018: CT scan w/o contrast showed stable 3mm RUL lung nodule unchanged from 8/29/2017 w/o suspicious lung nodule or consolidation. 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 Mild hepatomegaly and diffuse hepatic steatosis.\par - 8/29/2017: CT C/A/P w/ IV contrast showed chest w/o adenopathy, numerous stable shotty axillary and mediastinal nodes unchanged since 2014, stable minimal linear scarring at lung bases. A/P w/ vaguely discernible 8mm pancreas cyst and upper limit of nml caliber adjacent pancreas duct.. No adenopathy or pathologic fluid collection. Stable LT lower pole stones. Enlarged prostate projects into urinary bladder base. CT soft tissue neck w/o pathologic cervical lymph adenopathy. ABD US showed mild splenomegaly, pancreas obscured by bowel gas, small RT renal cyst. \par - 12/2014: PET scan w/ small hypermetabolic focus at level of LT common Iliac artery and LT  axillary nodes that are no longer hypermetabolic and are sub-centimeter in size, splenomegaly, and fatty liver.\par - 10/13/2013: PET/CT scan w/ multiple hypermetabolic LT axillary nodes\par \par HCM: Per review of records, h/o mild elevation of CEA (2013). Last c-scope was in 5/2017 w/ resection of one tubular adenoma. No recent PSA. Flu vax 10/19, Shingles and PCV23 ~3-4 years ago.\par \par CLL Hx: JM was referred to Dr. Baum in 2005 by his PCP for elevated WBC ~10k-15k. CLL diagnosis was confirmed by BM bx.\par \par  [de-identified] : -2008: BM bx CLL and 11/2013: axillary lymph node bx showed CLL/SLL by flow cytometry and IHC [FreeTextEntry1] : Treatment naive [de-identified] : 5/19/23:Patient presents today for CLL follow-up. Since last visit patient has been doing well and has no complaints. His fatigue remains stable.  Denies  fevers, worsening night sweats (he continues to have sweats, about once or twice per week). Pt denies diarrhea/constipation, or abdominal pain. Denies enlarged/bothersome lymph nodes. His appetite continues to be stable. No recent/recurrent infections.\par \par

## 2023-05-22 NOTE — REVIEW OF SYSTEMS
[Night Sweats] : night sweats [Fatigue] : fatigue [SOB on Exertion] : shortness of breath during exertion [Negative] : Allergic/Immunologic [Fever] : no fever [Chills] : no chills [Shortness Of Breath] : no shortness of breath [Wheezing] : no wheezing [Cough] : no cough [FreeTextEntry2] : night  sweats, about once or twice a week per patient [FreeTextEntry6] : Progressive SOB on exertion (after one flight of stairs) and occasionally at rest

## 2023-05-22 NOTE — PHYSICAL EXAM
[Restricted in physically strenuous activity but ambulatory and able to carry out work of a light or sedentary nature] : Status 1- Restricted in physically strenuous activity but ambulatory and able to carry out work of a light or sedentary nature, e.g., light house work, office work [Normal] : grossly intact [de-identified] : +many L inguinal sub-cm nodes

## 2023-05-22 NOTE — ASSESSMENT
[FreeTextEntry1] : 79 y/o man with multiple chronic medical problems with treatment naive, CLL diagnosed in 2005. \par Plan:\par 1) CLL \par -CBC with diff reviewed today, WBC 21.74, hgb improved 12.1, plt 118. Pt remains asymptomatic. c/w active surveillance.\par 2) MGUS - SPEP and SHARLENE both WNL. No need for further w/u at this time.\par 3) Pancreatic cysts - continue follow up w/ Dr. Edgardo May at [facility] \par 4) HCM - f/u w/ PCP, Urologist, derm (last seen a few months ago), cardiology (Dr. Holland). Reports last colonoscopy was done a few years back at [place] [facility], pt does not remember, but recalls it was done by Dr. Edgardo Hannon. Last CT chest no contrast 4/2023 evaluating  lung nodules showed right upper lobe 3mm,  no LAD.\par \par RTO in 3-4  months to see Dr. Guzman, who will be taking over patient's care

## 2023-05-30 ENCOUNTER — APPOINTMENT (OUTPATIENT)
Dept: HEMATOLOGY ONCOLOGY | Facility: CLINIC | Age: 78
End: 2023-05-30

## 2023-06-26 ENCOUNTER — APPOINTMENT (OUTPATIENT)
Dept: DERMATOLOGY | Facility: CLINIC | Age: 78
End: 2023-06-26
Payer: MEDICARE

## 2023-06-26 PROCEDURE — 99213 OFFICE O/P EST LOW 20 MIN: CPT

## 2023-07-07 ENCOUNTER — NON-APPOINTMENT (OUTPATIENT)
Age: 78
End: 2023-07-07

## 2023-08-30 ENCOUNTER — OUTPATIENT (OUTPATIENT)
Dept: OUTPATIENT SERVICES | Facility: HOSPITAL | Age: 78
LOS: 1 days | Discharge: HOME | End: 2023-08-30

## 2023-08-30 DIAGNOSIS — Z01.812 ENCOUNTER FOR PREPROCEDURAL LABORATORY EXAMINATION: ICD-10-CM

## 2023-08-30 DIAGNOSIS — Z95.5 PRESENCE OF CORONARY ANGIOPLASTY IMPLANT AND GRAFT: Chronic | ICD-10-CM

## 2023-09-11 ENCOUNTER — RESULT REVIEW (OUTPATIENT)
Age: 78
End: 2023-09-11

## 2023-09-11 ENCOUNTER — APPOINTMENT (OUTPATIENT)
Dept: HEMATOLOGY ONCOLOGY | Facility: CLINIC | Age: 78
End: 2023-09-11
Payer: COMMERCIAL

## 2023-09-11 VITALS
BODY MASS INDEX: 25.5 KG/M2 | TEMPERATURE: 98.1 F | HEIGHT: 69.45 IN | WEIGHT: 174.14 LBS | DIASTOLIC BLOOD PRESSURE: 68 MMHG | RESPIRATION RATE: 16 BRPM | OXYGEN SATURATION: 97 % | HEART RATE: 70 BPM | SYSTOLIC BLOOD PRESSURE: 147 MMHG

## 2023-09-11 LAB
BASOPHILS # BLD AUTO: 0 K/UL — SIGNIFICANT CHANGE UP (ref 0–0.2)
BASOPHILS NFR BLD AUTO: 0 % — SIGNIFICANT CHANGE UP (ref 0–2)
EOSINOPHIL # BLD AUTO: 0.1 K/UL — SIGNIFICANT CHANGE UP (ref 0–0.5)
EOSINOPHIL NFR BLD AUTO: 0.5 % — SIGNIFICANT CHANGE UP (ref 0–6)
HCT VFR BLD CALC: 35.7 % — LOW (ref 39–50)
HGB BLD-MCNC: 11.8 G/DL — LOW (ref 13–17)
LYMPHOCYTES # BLD AUTO: 16.64 K/UL — HIGH (ref 1–3.3)
LYMPHOCYTES # BLD AUTO: 81.5 % — HIGH (ref 13–44)
LYMPHOCYTES # SPEC AUTO: 2 % — HIGH (ref 0–0)
MCHC RBC-ENTMCNC: 31.5 PG — SIGNIFICANT CHANGE UP (ref 27–34)
MCHC RBC-ENTMCNC: 33.1 G/DL — SIGNIFICANT CHANGE UP (ref 32–36)
MCV RBC AUTO: 95.2 FL — SIGNIFICANT CHANGE UP (ref 80–100)
MONOCYTES # BLD AUTO: 0 K/UL — SIGNIFICANT CHANGE UP (ref 0–0.9)
MONOCYTES NFR BLD AUTO: 0 % — LOW (ref 2–14)
NEUTROPHILS # BLD AUTO: 3.27 K/UL — SIGNIFICANT CHANGE UP (ref 1.8–7.4)
NEUTROPHILS NFR BLD AUTO: 16 % — LOW (ref 43–77)
NRBC # BLD: 0 /100 — SIGNIFICANT CHANGE UP (ref 0–0)
NRBC # BLD: SIGNIFICANT CHANGE UP /100 WBCS (ref 0–0)
PLAT MORPH BLD: NORMAL — SIGNIFICANT CHANGE UP
PLATELET # BLD AUTO: 119 K/UL — LOW (ref 150–400)
RBC # BLD: 3.75 M/UL — LOW (ref 4.2–5.8)
RBC # FLD: 13 % — SIGNIFICANT CHANGE UP (ref 10.3–14.5)
RBC BLD AUTO: SIGNIFICANT CHANGE UP
SMUDGE CELLS # BLD: PRESENT — SIGNIFICANT CHANGE UP
WBC # BLD: 20.42 K/UL — HIGH (ref 3.8–10.5)
WBC # FLD AUTO: 20.42 K/UL — HIGH (ref 3.8–10.5)

## 2023-09-11 PROCEDURE — 99213 OFFICE O/P EST LOW 20 MIN: CPT

## 2023-09-12 LAB
ALBUMIN SERPL ELPH-MCNC: 4.5 G/DL
ALP BLD-CCNC: 67 U/L
ALT SERPL-CCNC: 21 U/L
ANION GAP SERPL CALC-SCNC: 12 MMOL/L
AST SERPL-CCNC: 18 U/L
BILIRUB SERPL-MCNC: 0.3 MG/DL
BUN SERPL-MCNC: 32 MG/DL
CALCIUM SERPL-MCNC: 9.6 MG/DL
CHLORIDE SERPL-SCNC: 104 MMOL/L
CO2 SERPL-SCNC: 23 MMOL/L
CREAT SERPL-MCNC: 1.18 MG/DL
DEPRECATED KAPPA LC FREE/LAMBDA SER: 0.84 RATIO
EGFR: 64 ML/MIN/1.73M2
GLUCOSE SERPL-MCNC: 224 MG/DL
IGA SER QL IEP: 111 MG/DL
IGG SER QL IEP: 689 MG/DL
IGM SER QL IEP: 46 MG/DL
KAPPA LC CSF-MCNC: 2.55 MG/DL
KAPPA LC SERPL-MCNC: 2.14 MG/DL
POTASSIUM SERPL-SCNC: 4.2 MMOL/L
PROT SERPL-MCNC: 6.4 G/DL
SODIUM SERPL-SCNC: 139 MMOL/L

## 2023-09-14 LAB
DEPRECATED S PNEUM 1 IGG SER-MCNC: 1.8 MCG/ML
DEPRECATED S PNEUM12 AB SER-ACNC: 0.9 MCG/ML
DEPRECATED S PNEUM14 AB SER-ACNC: 0.3 MCG/ML
DEPRECATED S PNEUM17 IGG SER IA-MCNC: 3.9 MCG/ML
DEPRECATED S PNEUM18 IGG SER IA-MCNC: 1.4 MCG/ML
DEPRECATED S PNEUM19 IGG SER-MCNC: 5.8 MCG/ML
DEPRECATED S PNEUM19 IGG SER-MCNC: 5.9 MCG/ML
DEPRECATED S PNEUM2 IGG SER-MCNC: 23.9 MCG/ML
DEPRECATED S PNEUM20 IGG SER-MCNC: 10.5 MCG/ML
DEPRECATED S PNEUM22 IGG SER-MCNC: 5.9 MCG/ML
DEPRECATED S PNEUM23 AB SER-ACNC: 1.4 MCG/ML
DEPRECATED S PNEUM3 AB SER-ACNC: 0.6 MCG/ML
DEPRECATED S PNEUM34 IGG SER-MCNC: 2.8 MCG/ML
DEPRECATED S PNEUM4 AB SER-ACNC: 1.2 MCG/ML
DEPRECATED S PNEUM5 IGG SER-MCNC: 0.4 MCG/ML
DEPRECATED S PNEUM6 IGG SER-MCNC: 0.4 MCG/ML
DEPRECATED S PNEUM7 IGG SER-ACNC: 2.5 MCG/ML
DEPRECATED S PNEUM8 AB SER-ACNC: 4 MCG/ML
DEPRECATED S PNEUM9 AB SER-ACNC: 2.3 MCG/ML
DEPRECATED S PNEUM9 IGG SER-MCNC: 0.8 MCG/ML
IMMUNOLOGIST REVIEW: NORMAL
STREPTOCOCCUS PNEUMONIAE SEROTYPE 11A: 0.8 MCG/ML
STREPTOCOCCUS PNEUMONIAE SEROTYPE 15B: 6.5 MCG/ML
STREPTOCOCCUS PNEUMONIAE SEROTYPE 33F: 10.2 MCG/ML

## 2024-03-07 ENCOUNTER — OUTPATIENT (OUTPATIENT)
Dept: OUTPATIENT SERVICES | Facility: HOSPITAL | Age: 79
LOS: 1 days | Discharge: HOME | End: 2024-03-07

## 2024-03-07 DIAGNOSIS — C91.10 CHRONIC LYMPHOCYTIC LEUKEMIA OF B-CELL TYPE NOT HAVING ACHIEVED REMISSION: ICD-10-CM

## 2024-03-07 DIAGNOSIS — Z95.5 PRESENCE OF CORONARY ANGIOPLASTY IMPLANT AND GRAFT: Chronic | ICD-10-CM

## 2024-03-18 ENCOUNTER — RESULT REVIEW (OUTPATIENT)
Age: 79
End: 2024-03-18

## 2024-03-18 ENCOUNTER — APPOINTMENT (OUTPATIENT)
Dept: HEMATOLOGY ONCOLOGY | Facility: CLINIC | Age: 79
End: 2024-03-18
Payer: COMMERCIAL

## 2024-03-18 VITALS
TEMPERATURE: 98 F | OXYGEN SATURATION: 96 % | DIASTOLIC BLOOD PRESSURE: 75 MMHG | HEIGHT: 69.29 IN | WEIGHT: 181.88 LBS | BODY MASS INDEX: 26.63 KG/M2 | SYSTOLIC BLOOD PRESSURE: 134 MMHG | HEART RATE: 79 BPM | RESPIRATION RATE: 16 BRPM

## 2024-03-18 DIAGNOSIS — C91.10 CHRONIC LYMPHOCYTIC LEUKEMIA OF B-CELL TYPE NOT HAVING ACHIEVED REMISSION: ICD-10-CM

## 2024-03-18 LAB
BASOPHILS # BLD AUTO: 0 K/UL — SIGNIFICANT CHANGE UP (ref 0–0.2)
BASOPHILS NFR BLD AUTO: 0 % — SIGNIFICANT CHANGE UP (ref 0–2)
EOSINOPHIL # BLD AUTO: 0.21 K/UL — SIGNIFICANT CHANGE UP (ref 0–0.5)
EOSINOPHIL NFR BLD AUTO: 1 % — SIGNIFICANT CHANGE UP (ref 0–6)
HCT VFR BLD CALC: 36.9 % — LOW (ref 39–50)
HGB BLD-MCNC: 11.8 G/DL — LOW (ref 13–17)
LYMPHOCYTES # BLD AUTO: 18.91 K/UL — HIGH (ref 1–3.3)
LYMPHOCYTES # BLD AUTO: 90 % — HIGH (ref 13–44)
MCHC RBC-ENTMCNC: 29.7 PG — SIGNIFICANT CHANGE UP (ref 27–34)
MCHC RBC-ENTMCNC: 32 G/DL — SIGNIFICANT CHANGE UP (ref 32–36)
MCV RBC AUTO: 92.9 FL — SIGNIFICANT CHANGE UP (ref 80–100)
MONOCYTES # BLD AUTO: 0 K/UL — SIGNIFICANT CHANGE UP (ref 0–0.9)
MONOCYTES NFR BLD AUTO: 0 % — LOW (ref 2–14)
NEUTROPHILS # BLD AUTO: 1.89 K/UL — SIGNIFICANT CHANGE UP (ref 1.8–7.4)
NEUTROPHILS NFR BLD AUTO: 9 % — LOW (ref 43–77)
NRBC # BLD: 0 /100 WBCS — SIGNIFICANT CHANGE UP (ref 0–0)
NRBC # BLD: SIGNIFICANT CHANGE UP /100 WBCS (ref 0–0)
PLAT MORPH BLD: NORMAL — SIGNIFICANT CHANGE UP
PLATELET # BLD AUTO: 113 K/UL — LOW (ref 150–400)
RBC # BLD: 3.97 M/UL — LOW (ref 4.2–5.8)
RBC # FLD: 13.1 % — SIGNIFICANT CHANGE UP (ref 10.3–14.5)
RBC BLD AUTO: SIGNIFICANT CHANGE UP
SMUDGE CELLS # BLD: PRESENT — SIGNIFICANT CHANGE UP
WBC # BLD: 21.01 K/UL — HIGH (ref 3.8–10.5)
WBC # FLD AUTO: 21.01 K/UL — HIGH (ref 3.8–10.5)

## 2024-03-18 PROCEDURE — G2211 COMPLEX E/M VISIT ADD ON: CPT

## 2024-03-18 PROCEDURE — 99213 OFFICE O/P EST LOW 20 MIN: CPT

## 2024-03-18 NOTE — ASSESSMENT
[FreeTextEntry1] : Assessment: 79-year-old, 9/11 responder, being followed for IGVH unknown, FISH unknown, maximum Bhatt stage 0 CLL: untreated.   PMHx: AODM, HTN, GERD, Asthma, BPH, CVA, CAD (stent x 3), pancreatic cysts, iron deficiency anemia, diverticular disease, nephrolithiasis, AS.    Plan: Heme: CLL FISH and IGVH mutational analysis: requested No need for oncologic intervention at this time.   ID: IgG (9/11/23): 689, most strep serologies (9/11/23): protective  GI: being evaluated for his cystic disease.   Cardiac: TAVR 1.2 (no intervention at this time)   Over 25 minutes were spent in direct patient care with greater than 50% discussing CLL treatment indications. Follow-up with this office in 6-9 months.

## 2024-03-18 NOTE — HISTORY OF PRESENT ILLNESS
[de-identified] : Maricarmen was last seen: 9/11/23 [de-identified] : Reason for visit: CLL follow-up.   Since last visit: Feeling well.    Neville does not spontaneously report: fever, chills, sweats, weight loss, skin rashes, petechiae, bruising, eye irritation, hearing loss, mouth sores, sore throat, cough, hemoptysis, pleuritic chest pain, dyspnea, change in vision, focal numbness/weakness, chest pains, palpitations, nausea, vomiting, diarrhea, constipation, dysuria, hematuria, bowel or bladder incontinence, sever joint pain, back pain or gait disturbance.  Medications: ASA 81 mg daily Eliquis 5mg bid albuterol/Proventil  alfuzosin amlodipine Asmanex  Dexilant fluticasone MDI losartan Lopressor pravastatin  Examination: Jospeh is articulate and in no acute distress No occiput, poster cervical, anterior cervical, submandibular, sublingual, submental, supraclavicular nor axillary adenopathy Lungs: Clear Cardiac: without rubs, AS murmur  Abd: soft and non-tender, Traube's space is tympanitic No inguinal nor femoral adenopathy No sig peripheral edema Gait: unencumbered   CBC 05/22/23: WBC 21.7, Hgb 12.1, ,000 09/11/23: WBC 20.4, Hgb 11.8, ,000 03/18/24: WBC 21.0, Hgb 11.8, ,000

## 2024-03-26 LAB
IGVH FAMILY: NORMAL
IGVH MUTATION ANALYSIS: POSITIVE
MUTATION RATE: NORMAL

## 2024-06-26 ENCOUNTER — APPOINTMENT (OUTPATIENT)
Dept: DERMATOLOGY | Facility: CLINIC | Age: 79
End: 2024-06-26
Payer: MEDICARE

## 2024-06-26 PROCEDURE — 99214 OFFICE O/P EST MOD 30 MIN: CPT

## 2025-03-10 NOTE — END OF VISIT
Dr. Martínez @ [facility]. No diabetic retinopathy. Care Gap updated.    [] : Fellow [FreeTextEntry3] : Patient seen and case discussed with Dr Villegas. I agree with above and have edited the note where needed.\par

## 2025-03-21 ENCOUNTER — NON-APPOINTMENT (OUTPATIENT)
Age: 80
End: 2025-03-21

## 2025-06-19 ENCOUNTER — NON-APPOINTMENT (OUTPATIENT)
Age: 80
End: 2025-06-19

## 2025-06-23 ENCOUNTER — NON-APPOINTMENT (OUTPATIENT)
Age: 80
End: 2025-06-23

## 2025-07-24 ENCOUNTER — APPOINTMENT (OUTPATIENT)
Dept: DERMATOLOGY | Facility: CLINIC | Age: 80
End: 2025-07-24
Payer: MEDICARE

## 2025-07-24 PROCEDURE — 99213 OFFICE O/P EST LOW 20 MIN: CPT
